# Patient Record
Sex: FEMALE | Race: WHITE | NOT HISPANIC OR LATINO | Employment: FULL TIME | URBAN - METROPOLITAN AREA
[De-identification: names, ages, dates, MRNs, and addresses within clinical notes are randomized per-mention and may not be internally consistent; named-entity substitution may affect disease eponyms.]

---
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
## 2018-03-12 ENCOUNTER — TRANSFERRED RECORDS (OUTPATIENT)
Dept: HEALTH INFORMATION MANAGEMENT | Facility: CLINIC | Age: 18
End: 2018-03-12

## 2018-03-12 LAB
ALT SERPL-CCNC: 124 U/L (ref 13–56)
AST SERPL-CCNC: 160 U/L (ref 15–37)
INR PPP: 1.16

## 2018-03-13 ENCOUNTER — TRANSFERRED RECORDS (OUTPATIENT)
Dept: HEALTH INFORMATION MANAGEMENT | Facility: CLINIC | Age: 18
End: 2018-03-13

## 2018-03-13 LAB
CREAT SERPL-MCNC: 0.65 MG/DL (ref 0.55–1)
GFR SERPL CREATININE-BSD FRML MDRD: 128 ML/MIN/1.73M2
GLUCOSE SERPL-MCNC: 130 MG/DL (ref 70–100)
POTASSIUM SERPL-SCNC: 3.7 MMOL/L (ref 3.5–5.1)

## 2018-03-14 ENCOUNTER — TRANSFERRED RECORDS (OUTPATIENT)
Dept: HEALTH INFORMATION MANAGEMENT | Facility: CLINIC | Age: 18
End: 2018-03-14

## 2018-03-16 ENCOUNTER — TRANSFERRED RECORDS (OUTPATIENT)
Dept: HEALTH INFORMATION MANAGEMENT | Facility: CLINIC | Age: 18
End: 2018-03-16

## 2018-03-19 ENCOUNTER — TELEPHONE (OUTPATIENT)
Dept: FAMILY MEDICINE | Facility: CLINIC | Age: 18
End: 2018-03-19

## 2018-03-19 NOTE — TELEPHONE ENCOUNTER
"Mother called regarding appointment tomorrow and getting records in time for appointment. Pt was hospitalized at:    [place], [place]  [phone]    Called [facility] in medical records  [phone]  Who said no release on file.    Mother notified to sign/fax release today otherwise pt can sign when in clinic tomorrow and we can fax the request to  [phone] with \"STAT release. Patient in clinic\"    VERONIQUE Guerra, RN  [facility]     "

## 2018-03-20 ENCOUNTER — OFFICE VISIT (OUTPATIENT)
Dept: FAMILY MEDICINE | Facility: CLINIC | Age: 18
End: 2018-03-20
Payer: COMMERCIAL

## 2018-03-20 VITALS
SYSTOLIC BLOOD PRESSURE: 101 MMHG | DIASTOLIC BLOOD PRESSURE: 69 MMHG | WEIGHT: 139 LBS | RESPIRATION RATE: 16 BRPM | TEMPERATURE: 97.8 F | HEART RATE: 99 BPM | BODY MASS INDEX: 21.07 KG/M2 | HEIGHT: 68 IN

## 2018-03-20 DIAGNOSIS — S27.0XXA TRAUMATIC PNEUMOTHORAX, INITIAL ENCOUNTER: ICD-10-CM

## 2018-03-20 DIAGNOSIS — S36.113A LACERATION OF LIVER, INITIAL ENCOUNTER: ICD-10-CM

## 2018-03-20 DIAGNOSIS — S22.49XA CLOSED FRACTURE OF MULTIPLE RIBS, UNSPECIFIED LATERALITY, INITIAL ENCOUNTER: Primary | ICD-10-CM

## 2018-03-20 DIAGNOSIS — R79.89 ELEVATED LFTS: ICD-10-CM

## 2018-03-20 LAB
ALBUMIN UR-MCNC: NEGATIVE MG/DL
APPEARANCE UR: CLEAR
BILIRUB UR QL STRIP: NEGATIVE
COLOR UR AUTO: YELLOW
ERYTHROCYTE [DISTWIDTH] IN BLOOD BY AUTOMATED COUNT: 12 % (ref 10–15)
GLUCOSE UR STRIP-MCNC: NEGATIVE MG/DL
HCT VFR BLD AUTO: 41.6 % (ref 35–47)
HGB BLD-MCNC: 14.7 G/DL (ref 11.7–15.7)
HGB UR QL STRIP: NEGATIVE
INR PPP: 0.91 (ref 0.86–1.14)
KETONES UR STRIP-MCNC: NEGATIVE MG/DL
LEUKOCYTE ESTERASE UR QL STRIP: NEGATIVE
MCH RBC QN AUTO: 31.3 PG (ref 26.5–33)
MCHC RBC AUTO-ENTMCNC: 35.3 G/DL (ref 31.5–36.5)
MCV RBC AUTO: 89 FL (ref 78–100)
NITRATE UR QL: NEGATIVE
PH UR STRIP: 6 PH (ref 5–7)
PLATELET # BLD AUTO: 237 10E9/L (ref 150–450)
RBC # BLD AUTO: 4.7 10E12/L (ref 3.8–5.2)
SOURCE: NORMAL
SP GR UR STRIP: 1.01 (ref 1–1.03)
UROBILINOGEN UR STRIP-ACNC: 0.2 EU/DL (ref 0.2–1)
WBC # BLD AUTO: 5.4 10E9/L (ref 4–11)

## 2018-03-20 PROCEDURE — 80053 COMPREHEN METABOLIC PANEL: CPT | Performed by: NURSE PRACTITIONER

## 2018-03-20 PROCEDURE — 36415 COLL VENOUS BLD VENIPUNCTURE: CPT | Performed by: NURSE PRACTITIONER

## 2018-03-20 PROCEDURE — 81003 URINALYSIS AUTO W/O SCOPE: CPT | Performed by: NURSE PRACTITIONER

## 2018-03-20 PROCEDURE — 85610 PROTHROMBIN TIME: CPT | Performed by: NURSE PRACTITIONER

## 2018-03-20 PROCEDURE — 85027 COMPLETE CBC AUTOMATED: CPT | Performed by: NURSE PRACTITIONER

## 2018-03-20 PROCEDURE — 99214 OFFICE O/P EST MOD 30 MIN: CPT | Performed by: NURSE PRACTITIONER

## 2018-03-20 NOTE — MR AVS SNAPSHOT
"              After Visit Summary   3/20/2018    Trice Fletcher    MRN: [id number]           Patient Information     Date Of Birth          2000        Visit Information        Provider Department      3/20/2018 2:15 PM Tracy Barlow NP [facility]        Today's Diagnoses     Closed fracture of multiple ribs, unspecified laterality, initial encounter    -  1    Traumatic pneumothorax, initial encounter        Laceration of liver, initial encounter           Follow-ups after your visit        Your next 10 appointments already scheduled     Mar 27, 2018  3:30 PM CDT   PHYSICAL with Tracy Barlow NP   [facility] ([facility]    [address] [facility] [address] [phone]              Who to contact     If you have questions or need follow up information about today's clinic visit or your schedule please contact [facility] directly at [phone].  Normal or non-critical lab and imaging results will be communicated to you by MyChart, letter or phone within 4 business days after the clinic has received the results. If you do not hear from us within 7 days, please contact the clinic through [employer] or phone. If you have a critical or abnormal lab result, we will notify you by phone as soon as possible.  Submit refill requests through [employer] or call your pharmacy and they will forward the refill request to us. Please allow 3 business days for your refill to be completed.          Additional Information About Your Visit        [employer] Information     [employer] lets you send messages to your doctor, view your test results, renew your prescriptions, schedule appointments and more. To sign up, go to [email or web address] . Click on \"Log in\" on the left side of the screen, which will take you to the Welcome page. Then click on \"Sign up Now\" on the right side of the page.     You will be asked to enter the access code " "listed below, as well as some personal information. Please follow the directions to create your username and password.     Your access code is: [email or web address]  Expires: 2018  2:15 PM     Your access code will  in 90 days. If you need help or a new code, please call your [facility] clinic or [phone].        Care EveryWhere ID     This is your Care EveryWhere ID. This could be used by other organizations to access your [facility] medical records  [id number]        Your Vitals Were     Pulse Temperature Respirations Height BMI (Body Mass Index)       99 97.8  F (36.6  C) (Oral) 16 5' 8\" (1.727 m) 21.13 kg/m2        Blood Pressure from Last 3 Encounters:   18 101/69   16 98/56   10/07/16 108/64    Weight from Last 3 Encounters:   18 139 lb (63 kg) (69 %)*   16 152 lb (68.9 kg) (86 %)*   10/07/16 151 lb (68.5 kg) (85 %)*     * Growth percentiles are based on CDC 2-20 Years data.              We Performed the Following     *UA reflex to Microscopic     CBC with platelets     Comprehensive metabolic panel (BMP + Alb, Alk Phos, ALT, AST, Total. Bili, TP)     INR        Primary Care Provider Office Phone # Fax #    Tracy SIENNA Barlow [phone] [address] [facility] [address]        Equal Access to Services     SKYE GRAY : Hadii aad ku hadasho Soomaali, waaxda luqadaha, qaybta kaalmada genetegyada, kaushal irby . So [facility] [phone].    ATENCIÓN: Si habla español, tiene a verma disposición servicios gratuitos de asistencia lingüística. Llame al [phone].    We comply with applicable federal civil rights laws and [place] laws. We do not discriminate on the basis of race, color, national origin, age, disability, sex, sexual orientation, or gender identity.            Thank you!     Thank you for choosing [facility]  for your care. Our goal is always to provide you with excellent care. Hearing back from our " patients is one way we can continue to improve our services. Please take a few minutes to complete the written survey that you may receive in the mail after your visit with us. Thank you!             Your Updated Medication List - Protect others around you: Learn how to safely use, store and throw away your medicines at www.disposemymeds.org.          This list is accurate as of 3/20/18  6:40 PM.  Always use your most recent med list.                   Brand Name Dispense Instructions for use Diagnosis    5-HTP PO      Take 50 mg by mouth daily        desogestrel-ethinyl estradiol 0.15-30 MG-MCG per tablet    APRI    84 tablet    Take 1 tablet by mouth daily    Acne, unspecified acne type       FISH OIL      1 tab daily        VESTURA 3-0.02 MG per tablet   Generic drug:  drospirenone-ethinyl estradiol      Take 1 tablet by mouth daily        VITAMIN D (CHOLECALCIFEROL) PO      Take 5,000 Units by mouth daily
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
## 2018-03-21 ENCOUNTER — DOCUMENTATION ONLY (OUTPATIENT)
Dept: FAMILY MEDICINE | Facility: CLINIC | Age: 18
End: 2018-03-21

## 2018-03-21 LAB
ALBUMIN SERPL-MCNC: 3.4 G/DL (ref 3.4–5)
ALP SERPL-CCNC: 103 U/L (ref 40–150)
ALT SERPL W P-5'-P-CCNC: 78 U/L (ref 0–50)
ANION GAP SERPL CALCULATED.3IONS-SCNC: 5 MMOL/L (ref 3–14)
AST SERPL W P-5'-P-CCNC: 46 U/L (ref 0–35)
BILIRUB SERPL-MCNC: 0.5 MG/DL (ref 0.2–1.3)
BUN SERPL-MCNC: 10 MG/DL (ref 7–30)
CALCIUM SERPL-MCNC: 8.7 MG/DL (ref 8.5–10.1)
CHLORIDE SERPL-SCNC: 107 MMOL/L (ref 96–110)
CO2 SERPL-SCNC: 29 MMOL/L (ref 20–32)
CREAT SERPL-MCNC: 0.73 MG/DL (ref 0.5–1)
GFR SERPL CREATININE-BSD FRML MDRD: >90 ML/MIN/1.7M2
GLUCOSE SERPL-MCNC: 100 MG/DL (ref 70–99)
POTASSIUM SERPL-SCNC: 4.3 MMOL/L (ref 3.4–5.3)
PROT SERPL-MCNC: 7 G/DL (ref 6.8–8.8)
SODIUM SERPL-SCNC: 141 MMOL/L (ref 133–144)

## 2018-03-26 ENCOUNTER — TRANSFERRED RECORDS (OUTPATIENT)
Dept: HEALTH INFORMATION MANAGEMENT | Facility: CLINIC | Age: 18
End: 2018-03-26

## 2018-06-07 ENCOUNTER — OFFICE VISIT (OUTPATIENT)
Dept: FAMILY MEDICINE | Facility: CLINIC | Age: 18
End: 2018-06-07
Payer: COMMERCIAL

## 2018-06-07 VITALS
OXYGEN SATURATION: 98 % | TEMPERATURE: 98 F | WEIGHT: 143 LBS | DIASTOLIC BLOOD PRESSURE: 68 MMHG | HEART RATE: 73 BPM | BODY MASS INDEX: 21.74 KG/M2 | SYSTOLIC BLOOD PRESSURE: 104 MMHG | RESPIRATION RATE: 18 BRPM

## 2018-06-07 DIAGNOSIS — R79.89 ELEVATED LFTS: Primary | ICD-10-CM

## 2018-06-07 DIAGNOSIS — Z11.3 SCREEN FOR STD (SEXUALLY TRANSMITTED DISEASE): ICD-10-CM

## 2018-06-07 DIAGNOSIS — S22.49XD CLOSED FRACTURE OF MULTIPLE RIBS WITH ROUTINE HEALING, UNSPECIFIED LATERALITY, SUBSEQUENT ENCOUNTER: ICD-10-CM

## 2018-06-07 PROCEDURE — 99213 OFFICE O/P EST LOW 20 MIN: CPT | Performed by: NURSE PRACTITIONER

## 2018-06-07 PROCEDURE — 84450 TRANSFERASE (AST) (SGOT): CPT | Performed by: NURSE PRACTITIONER

## 2018-06-07 PROCEDURE — 84460 ALANINE AMINO (ALT) (SGPT): CPT | Performed by: NURSE PRACTITIONER

## 2018-06-07 PROCEDURE — 87491 CHLMYD TRACH DNA AMP PROBE: CPT | Performed by: NURSE PRACTITIONER

## 2018-06-07 PROCEDURE — 36415 COLL VENOUS BLD VENIPUNCTURE: CPT | Performed by: NURSE PRACTITIONER
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
## 2018-06-07 NOTE — MR AVS SNAPSHOT
After Visit Summary   6/7/2018    Trice Fletcher    MRN: [id number]           Patient Information     Date Of Birth          2000        Visit Information        Provider Department      6/7/2018 1:00 PM Tracy Barlow NP [facility]        Today's Diagnoses     Elevated LFTs    -  1    Closed fracture of multiple ribs with routine healing, unspecified laterality, subsequent encounter        Screen for STD (sexually transmitted disease)           Follow-ups after your visit        Who to contact     If you have questions or need follow up information about today's clinic visit or your schedule please contact [facility] directly at [phone].  Normal or non-critical lab and imaging results will be communicated to you by MyChart, letter or phone within 4 business days after the clinic has received the results. If you do not hear from us within 7 days, please contact the clinic through MyChart or phone. If you have a critical or abnormal lab result, we will notify you by phone as soon as possible.  Submit refill requests through [employer] or call your pharmacy and they will forward the refill request to us. Please allow 3 business days for your refill to be completed.          Additional Information About Your Visit        MyChart Information     Linkage gives you secure access to your electronic health record. If you see a primary care provider, you can also send messages to your care team and make appointments. If you have questions, please call your primary care clinic.  If you do not have a primary care provider, please call [phone] and they will assist you.        Care EveryWhere ID     This is your Care EveryWhere ID. This could be used by other organizations to access your [facility] medical records  [id number]        Your Vitals Were     Pulse Temperature Respirations Pulse Oximetry BMI (Body Mass Index)       73 98  F (36.7  C) (Oral) 18 98%  21.74 kg/m2        Blood Pressure from Last 3 Encounters:   06/07/18 104/68   03/20/18 101/69   11/30/16 98/56    Weight from Last 3 Encounters:   06/07/18 143 lb (64.9 kg) (74 %)*   03/20/18 139 lb (63 kg) (69 %)*   11/30/16 152 lb (68.9 kg) (86 %)*     * Growth percentiles are based on [facility] 2-20 Years data.              We Performed the Following     **ALT FUTURE anytime     AST     Chlamydia trachomatis PCR        Primary Care Provider Office Phone # Fax #    Tracy EL CHELSEA Barlow [phone] [address] [facility] [address]        Equal Access to Services     ZACKERY GRAY : Hadii norman velao Somarycruz, waaxda luqadaha, qaybta kaalmada adeegyada, waxay theo irby . So [facility] [phone].    ATENCIÓN: Si habla español, tiene a verma disposición servicios gratuitos de asistencia lingüística. [facility] [phone].    We comply with applicable federal civil rights laws and [place] laws. We do not discriminate on the basis of race, color, national origin, age, disability, sex, sexual orientation, or gender identity.            Thank you!     Thank you for choosing [facility]  for your care. Our goal is always to provide you with excellent care. Hearing back from our patients is one way we can continue to improve our services. Please take a few minutes to complete the written survey that you may receive in the mail after your visit with us. Thank you!             Your Updated Medication List - Protect others around you: Learn how to safely use, store and throw away your medicines at www.disposemymeds.org.          This list is accurate as of 6/7/18  1:42 PM.  Always use your most recent med list.                   Brand Name Dispense Instructions for use Diagnosis    desogestrel-ethinyl estradiol 0.15-30 MG-MCG per tablet    APRI    84 tablet    Take 1 tablet by mouth daily    Acne, unspecified acne type       FISH OIL      1 tab daily        VITAMIN  D (CHOLECALCIFEROL) PO      Take 5,000 Units by mouth daily

## 2018-06-08 LAB
ALT SERPL W P-5'-P-CCNC: 23 U/L (ref 0–50)
AST SERPL W P-5'-P-CCNC: 16 U/L (ref 0–35)
C TRACH DNA SPEC QL NAA+PROBE: NEGATIVE
SPECIMEN SOURCE: NORMAL

## 2018-08-17 ENCOUNTER — OFFICE VISIT (OUTPATIENT)
Dept: FAMILY MEDICINE | Facility: CLINIC | Age: 18
End: 2018-08-17
Payer: COMMERCIAL

## 2018-08-17 VITALS
SYSTOLIC BLOOD PRESSURE: 106 MMHG | DIASTOLIC BLOOD PRESSURE: 62 MMHG | HEIGHT: 68 IN | WEIGHT: 141 LBS | BODY MASS INDEX: 21.37 KG/M2 | HEART RATE: 80 BPM

## 2018-08-17 DIAGNOSIS — Z83.49 FAMILY HISTORY OF THYROID DISEASE: ICD-10-CM

## 2018-08-17 DIAGNOSIS — Z00.00 WELL FEMALE EXAM WITHOUT GYNECOLOGICAL EXAM: Primary | ICD-10-CM

## 2018-08-17 DIAGNOSIS — E55.9 VITAMIN D DEFICIENCY: ICD-10-CM

## 2018-08-17 DIAGNOSIS — L70.9 ACNE, UNSPECIFIED ACNE TYPE: ICD-10-CM

## 2018-08-17 PROCEDURE — 84443 ASSAY THYROID STIM HORMONE: CPT | Performed by: NURSE PRACTITIONER

## 2018-08-17 PROCEDURE — 99385 PREV VISIT NEW AGE 18-39: CPT | Performed by: NURSE PRACTITIONER

## 2018-08-17 PROCEDURE — 36415 COLL VENOUS BLD VENIPUNCTURE: CPT | Performed by: NURSE PRACTITIONER

## 2018-08-17 PROCEDURE — 82306 VITAMIN D 25 HYDROXY: CPT | Performed by: NURSE PRACTITIONER

## 2018-08-17 RX ORDER — DESOGESTREL AND ETHINYL ESTRADIOL 0.15-0.03
1 KIT ORAL DAILY
Qty: 84 TABLET | Refills: 3 | Status: SHIPPED | OUTPATIENT
Start: 2018-08-17 | End: 2019-12-31
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
## 2018-08-17 NOTE — MR AVS SNAPSHOT
After Visit Summary   8/17/2018    Trice Fletcher    MRN: [id number]           Patient Information     Date Of Birth          2000        Visit Information        Provider Department      8/17/2018 3:00 PM Marguerite Knott APRN [facility]        Today's Diagnoses     Well female exam without gynecological exam    -  1    Acne, unspecified acne type        Vitamin D deficiency        Family history of thyroid disease          Care Instructions      Preventive Health Recommendations  Female Ages 18 to 20     Yearly exam:     See your health care provider every year in order to  o Review health changes.   o Discuss preventive care.    o Review your medicines if your doctor has prescribed any.      You should be tested each year for STDs (sexually transmitted diseases).       After age 20, talk to your provider about how often you should have cholesterol testing.      If you are at risk for diabetes, you should have a diabetes test (fasting glucose).     Shots:     Get a flu shot each year.     Get a tetanus shot every 10 years.     Consider getting the shot (vaccine) that prevents cervical cancer (Gardasil).    Nutrition:     Eat at least 5 servings of fruits and vegetables each day.    Eat whole-grain bread, whole-wheat pasta and brown rice instead of white grains and rice.    Get adequate Calcium and Vitamin D.     Lifestyle    Exercise at least 150 minutes a week each week (30 minutes a day, 5 days a week). This will help you control your weight and prevent disease.    No smoking.     Wear sunscreen to prevent skin cancer.    See your dentist every six months for an exam and cleaning.    Preventive Health Recommendations  Female Ages 18 to 20     Yearly exam:     See your health care provider every year in order to  o Review health changes.   o Discuss preventive care.    o Review your medicines if your doctor has prescribed any.      You should be tested each year for  STDs (sexually transmitted diseases).       After age 20, talk to your provider about how often you should have cholesterol testing.      If you are at risk for diabetes, you should have a diabetes test (fasting glucose).     Shots:     Get a flu shot each year.     Get a tetanus shot every 10 years.     Consider getting the shot (vaccine) that prevents cervical cancer (Gardasil).    Nutrition:     Eat at least 5 servings of fruits and vegetables each day.    Eat whole-grain bread, whole-wheat pasta and brown rice instead of white grains and rice.    Get adequate Calcium and Vitamin D.     Lifestyle    Exercise at least 150 minutes a week each week (30 minutes a day, 5 days a week). This will help you control your weight and prevent disease.    No smoking.     Wear sunscreen to prevent skin cancer.    See your dentist every six months for an exam and cleaning.          Follow-ups after your visit        Who to contact     If you have questions or need follow up information about today's clinic visit or your schedule please contact [facility] directly at [phone].  Normal or non-critical lab and imaging results will be communicated to you by [employer], letter or phone within 4 business days after the clinic has received the results. If you do not hear from us within 7 days, please contact the clinic through [employer] or phone. If you have a critical or abnormal lab result, we will notify you by phone as soon as possible.  Submit refill requests through [employer] or call your pharmacy and they will forward the refill request to us. Please allow 3 business days for your refill to be completed.          Additional Information About Your Visit        [employer] Information     [employer] gives you secure access to your electronic health record. If you see a primary care provider, you can also send messages to your care team and make appointments. If you have questions, please call your primary care clinic.   "If you do not have a primary care provider, please call [phone] and they will assist you.        Care EveryWhere ID     This is your Care EveryWhere ID. This could be used by other organizations to access your [facility] medical records  [id number]        Your Vitals Were     Pulse Height Last Period Breastfeeding? BMI (Body Mass Index)       80 5' 8\" (1.727 m) 07/27/2018 No 21.44 kg/m2        Blood Pressure from Last 3 Encounters:   08/17/18 106/62   06/07/18 104/68   03/20/18 101/69    Weight from Last 3 Encounters:   08/17/18 141 lb (64 kg) (71 %)*   06/07/18 143 lb (64.9 kg) (74 %)*   03/20/18 139 lb (63 kg) (69 %)*     * Growth percentiles are based on [facility] 2-20 Years data.              We Performed the Following     TSH with free T4 reflex     Vitamin D Deficiency          Where to get your medicines      These medications were sent to [id number] - [place], [place] - [address] AT [facility] [address]  [facility] [address], [place] [address]    Hours:  24-hours Phone:  [phone]     desogestrel-ethinyl estradiol 0.15-30 MG-MCG per tablet          Primary Care Provider Office Phone # Fax #    Tracy SIENNA Barlow [phone]       [address] [facility] [address]        Equal Access to Services     ZACKERY GRAY AH: Hadii aad ku hadasho Soomaali, waaxda luqadaha, qaybta kaalmada genetegyada, waxcarlitos theo garza. So [place] [phone].    ATENCIÓN: Si elijahla nessa, tiene a verma disposición servicios gratuitos de asistencia lingüística. Llame al [phone].    We comply with applicable federal civil rights laws and [place] laws. We do not discriminate on the basis of race, color, national origin, age, disability, sex, sexual orientation, or gender identity.            Thank you!     Thank you for choosing [facility]  for your care. Our goal is always to provide you with excellent care. Hearing back from our patients " is one way we can continue to improve our services. Please take a few minutes to complete the written survey that you may receive in the mail after your visit with us. Thank you!             Your Updated Medication List - Protect others around you: Learn how to safely use, store and throw away your medicines at www.disposemymeds.org.          This list is accurate as of 8/17/18 11:59 PM.  Always use your most recent med list.                   Brand Name Dispense Instructions for use Diagnosis    desogestrel-ethinyl estradiol 0.15-30 MG-MCG per tablet    APRI    84 tablet    Take 1 tablet by mouth daily    Acne, unspecified acne type       FISH OIL      1 tab daily        UNKNOWN TO PATIENT           VITAMIN D (CHOLECALCIFEROL) PO      Take 5,000 Units by mouth daily

## 2018-08-18 LAB — TSH SERPL DL<=0.005 MIU/L-ACNC: 1.39 MU/L (ref 0.4–4)

## 2018-08-20 LAB — DEPRECATED CALCIDIOL+CALCIFEROL SERPL-MC: 50 UG/L (ref 20–75)

## 2018-08-31 ENCOUNTER — TELEPHONE (OUTPATIENT)
Dept: FAMILY MEDICINE | Facility: CLINIC | Age: 18
End: 2018-08-31

## 2018-08-31 DIAGNOSIS — L70.0 ACNE VULGARIS: Primary | ICD-10-CM

## 2018-08-31 RX ORDER — DROSPIRENONE AND ETHINYL ESTRADIOL 0.02-3(28)
1 KIT ORAL DAILY
Qty: 84 TABLET | Refills: 3 | Status: SHIPPED | OUTPATIENT
Start: 2018-08-31 | End: 2019-12-31

## 2018-08-31 NOTE — TELEPHONE ENCOUNTER
Reason for Call:  Medication or medication refill:    Do you use a [facility]?  Name of the pharmacy and phone number for the current request:  [facility] on [address] - [phone]    Name of the medication requested: Loryna 3 mg/ 0.02 mg    Other request: patient said that at her appointment she gave there wrong name of birthcontrol medication that she has been using so she is wanting the correct birthcontrol medication filled for her    Can we leave a detailed message on this number? YES    Phone number patient can be reached at: Home number on file [phone] (home)    Best Time: asap    Call taken on 8/31/2018 at 8:38 AM by Mary Kay Aden

## 2019-12-16 ENCOUNTER — HEALTH MAINTENANCE LETTER (OUTPATIENT)
Age: 19
End: 2019-12-16

## 2019-12-31 ENCOUNTER — OFFICE VISIT (OUTPATIENT)
Dept: FAMILY MEDICINE | Facility: CLINIC | Age: 19
End: 2019-12-31
Payer: COMMERCIAL

## 2019-12-31 VITALS
OXYGEN SATURATION: 97 % | BODY MASS INDEX: 20.98 KG/M2 | HEART RATE: 74 BPM | WEIGHT: 138 LBS | SYSTOLIC BLOOD PRESSURE: 102 MMHG | TEMPERATURE: 98.5 F | RESPIRATION RATE: 18 BRPM | DIASTOLIC BLOOD PRESSURE: 64 MMHG

## 2019-12-31 DIAGNOSIS — J01.90 ACUTE SINUSITIS WITH SYMPTOMS > 10 DAYS: Primary | ICD-10-CM

## 2019-12-31 PROCEDURE — 99213 OFFICE O/P EST LOW 20 MIN: CPT | Performed by: FAMILY MEDICINE

## 2019-12-31 RX ORDER — FLUTICASONE PROPIONATE 50 MCG
1-2 SPRAY, SUSPENSION (ML) NASAL DAILY
Qty: 16 G | Refills: 11 | Status: SHIPPED | OUTPATIENT
Start: 2019-12-31 | End: 2023-06-21

## 2019-12-31 RX ORDER — AMOXICILLIN 500 MG/1
1000 CAPSULE ORAL 2 TIMES DAILY
Qty: 40 CAPSULE | Refills: 0 | Status: SHIPPED | OUTPATIENT
Start: 2019-12-31 | End: 2020-08-05

## 2019-12-31 NOTE — PROGRESS NOTES
Subjective     Trice Fletcher is a 19 year old female who presents to clinic today for the following health issues:    HPI   Acute Illness   Acute illness concerns: congestion  Onset: x a few weeks    Fever: YES    Chills/Sweats: no     Headache (location?): YES- Temples and frontal lobe    Sinus Pressure:no    Conjunctivitis:  no    Ear Pain: no    Rhinorrhea: YES    Congestion: YES    Sore Throat: no      Cough: YES-productive of yellow sputum    Wheeze: YES    Decreased Appetite: YES    Nausea: no     Vomiting: no     Diarrhea:  no     Dysuria/Freq.: no     Fatigue/Achiness: YES    Sick/Strep Exposure: YES     Therapies Tried and outcome:  Tried cold formulations which help the symptoms some.     except of these symptoms occurring frequently this fall for unclear reasons. Usually they last fa couple of weeks then resolve then she gets them again a few weeks later.     She is living in a new place and has a dog. No other new exposures. 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 No smoking. Otherwise feels healthy in general.     Reviewed and updated as needed this visit by Provider  Allergies  Meds  Problems  Med Hx  Surg Hx         Review of Systems   No other const, cv, heme symptoms       Objective    /64   Pulse 74   Temp 98.5  F (36.9  C) (Tympanic)   Resp 18   Wt 62.6 kg (138 lb)   SpO2 97%   BMI 20.98 kg/m    Body mass index is 20.98 kg/m .  Physical Exam   GENERAL: healthy, alert and no distress  HENT: normal cephalic/atraumatic, ear canals and TM's normal, nasal mucosa edematous , oropharynx clear and oral mucous membranes moist  NECK: no adenopathy, no asymmetry, masses, or scars and thyroid normal to palpation  RESP: lungs clear to auscultation - no rales, rhonchi or wheezes  CV: regular rate and rhythm, normal S1 S2, no S3 or S4, no murmur, click or rub, no peripheral edema and peripheral pulses strong        Assessment & Plan     1. Acute sinusitis with symptoms > 10  days  Decided to treat for allergies/ sinus infection at present. Set up for physical in a few weeks. She may have underlying allergies leading to more frequent sinus issues. Doubt that she acquired new immune compromise. May consider testing at her upcoming physcial if symptoms are persisting.   - amoxicillin (AMOXIL) 500 MG capsule; Take 2 capsules (1,000 mg) by mouth 2 times daily  Dispense: 40 capsule; Refill: 0  - fluticasone (FLONASE) 50 MCG/ACT nasal spray; Spray 1-2 sprays into both nostrils daily  Dispense: 16 g; Refill: 11       Return in about 3 weeks (around 1/21/2020) for Physical Exam.    Ismael Pizarro MD  [facility]

## 2020-03-22 ENCOUNTER — HEALTH MAINTENANCE LETTER (OUTPATIENT)
Age: 20
End: 2020-03-22

## 2020-08-05 ENCOUNTER — TELEPHONE (OUTPATIENT)
Dept: FAMILY MEDICINE | Facility: CLINIC | Age: 20
End: 2020-08-05

## 2020-08-05 ENCOUNTER — HOSPITAL ENCOUNTER (EMERGENCY)
Facility: CLINIC | Age: 20
Discharge: HOME | End: 2020-08-05
Attending: EMERGENCY MEDICINE | Admitting: EMERGENCY MEDICINE
Payer: COMMERCIAL

## 2020-08-05 ENCOUNTER — OFFICE VISIT (OUTPATIENT)
Dept: URGENT CARE | Facility: URGENT CARE | Age: 20
End: 2020-08-05
Payer: COMMERCIAL

## 2020-08-05 VITALS
TEMPERATURE: 98 F | WEIGHT: 140 LBS | DIASTOLIC BLOOD PRESSURE: 64 MMHG | HEIGHT: 68 IN | HEART RATE: 77 BPM | RESPIRATION RATE: 16 BRPM | SYSTOLIC BLOOD PRESSURE: 104 MMHG | BODY MASS INDEX: 21.22 KG/M2 | OXYGEN SATURATION: 98 %

## 2020-08-05 DIAGNOSIS — L50.9 URTICARIA: Primary | ICD-10-CM

## 2020-08-05 DIAGNOSIS — A08.4 VIRAL GASTROENTERITIS: ICD-10-CM

## 2020-08-05 DIAGNOSIS — T78.40XA ALLERGIC REACTION, INITIAL ENCOUNTER: ICD-10-CM

## 2020-08-05 PROCEDURE — 99214 OFFICE O/P EST MOD 30 MIN: CPT | Performed by: INTERNAL MEDICINE

## 2020-08-05 PROCEDURE — 99284 EMERGENCY DEPT VISIT MOD MDM: CPT | Mod: 25 | Performed by: EMERGENCY MEDICINE

## 2020-08-05 PROCEDURE — 96375 TX/PRO/DX INJ NEW DRUG ADDON: CPT | Performed by: EMERGENCY MEDICINE

## 2020-08-05 PROCEDURE — 96374 THER/PROPH/DIAG INJ IV PUSH: CPT | Performed by: EMERGENCY MEDICINE

## 2020-08-05 PROCEDURE — 96372 THER/PROPH/DIAG INJ SC/IM: CPT | Performed by: EMERGENCY MEDICINE

## 2020-08-05 PROCEDURE — 25000125 ZZHC RX 250: Performed by: EMERGENCY MEDICINE

## 2020-08-05 PROCEDURE — 25000128 H RX IP 250 OP 636: Performed by: EMERGENCY MEDICINE

## 2020-08-05 PROCEDURE — 99284 EMERGENCY DEPT VISIT MOD MDM: CPT | Mod: Z6 | Performed by: EMERGENCY MEDICINE

## 2020-08-05 RX ORDER — DIPHENHYDRAMINE HYDROCHLORIDE 50 MG/ML
25 INJECTION INTRAMUSCULAR; INTRAVENOUS ONCE
Status: COMPLETED | OUTPATIENT
Start: 2020-08-05 | End: 2020-08-05

## 2020-08-05 RX ORDER — METHYLPREDNISOLONE SODIUM SUCCINATE 125 MG/2ML
125 INJECTION, POWDER, LYOPHILIZED, FOR SOLUTION INTRAMUSCULAR; INTRAVENOUS ONCE
Status: COMPLETED | OUTPATIENT
Start: 2020-08-05 | End: 2020-08-05

## 2020-08-05 RX ORDER — LIDOCAINE 40 MG/G
CREAM TOPICAL
Status: DISCONTINUED | OUTPATIENT
Start: 2020-08-05 | End: 2020-08-06 | Stop reason: HOSPADM

## 2020-08-05 RX ORDER — EPINEPHRINE 0.3 MG/.3ML
0.3 INJECTION SUBCUTANEOUS
Qty: 1 EACH | Refills: 0 | Status: SHIPPED | OUTPATIENT
Start: 2020-08-05 | End: 2024-02-15

## 2020-08-05 RX ORDER — PREDNISONE 20 MG/1
40 TABLET ORAL DAILY
Qty: 10 TABLET | Refills: 0 | Status: SHIPPED | OUTPATIENT
Start: 2020-08-05 | End: 2020-08-10

## 2020-08-05 RX ORDER — SPIRONOLACTONE 50 MG/1
50 TABLET, FILM COATED ORAL DAILY
COMMUNITY

## 2020-08-05 RX ORDER — SODIUM CHLORIDE 9 MG/ML
INJECTION, SOLUTION INTRAVENOUS
Status: DISCONTINUED
Start: 2020-08-05 | End: 2020-08-06 | Stop reason: HOSPADM

## 2020-08-05 RX ADMIN — METHYLPREDNISOLONE SODIUM SUCCINATE 125 MG: 125 INJECTION, POWDER, FOR SOLUTION INTRAMUSCULAR; INTRAVENOUS at 21:05

## 2020-08-05 RX ADMIN — DIPHENHYDRAMINE HYDROCHLORIDE 25 MG: 50 INJECTION, SOLUTION INTRAMUSCULAR; INTRAVENOUS at 21:04

## 2020-08-05 RX ADMIN — EPINEPHRINE 0.5 MG: 1 INJECTION INTRAMUSCULAR; INTRAVENOUS; SUBCUTANEOUS at 20:59

## 2020-08-05 ASSESSMENT — ENCOUNTER SYMPTOMS
HEADACHES: 0
HEADACHES: 0
ARTHRALGIAS: 0
COLOR CHANGE: 0
ABDOMINAL PAIN: 0
WHEEZING: 0
DIFFICULTY URINATING: 0
SORE THROAT: 0
COUGH: 1
FEVER: 1
NECK STIFFNESS: 0
DIZZINESS: 0
FEVER: 0
MYALGIAS: 0
SHORTNESS OF BREATH: 0
TROUBLE SWALLOWING: 0
FACIAL SWELLING: 1
EYE REDNESS: 0
CONFUSION: 0

## 2020-08-05 ASSESSMENT — MIFFLIN-ST. JEOR: SCORE: 1448.54

## 2020-08-05 NOTE — ED AVS SNAPSHOT
[facility], [place], Emergency Department  [address]  [facility] [address]  Phone:  [phone]  Fax:  [phone]                                    Trice Fletcher   MRN: [id number]    Department:  [facility], Emergency Department   Date of Visit:  8/5/2020           After Visit Summary Signature Page    I have received my discharge instructions, and my questions have been answered. I have discussed any challenges I see with this plan with the nurse or doctor.    ..........................................................................................................................................  Patient/Patient Representative Signature      ..........................................................................................................................................  Patient Representative Print Name and Relationship to Patient    ..................................................               ................................................  Date                                   Time    ..........................................................................................................................................  Reviewed by Signature/Title    ...................................................              ..............................................  Date                                               Time          22EPIC Rev 08/18

## 2020-08-05 NOTE — PROGRESS NOTES
SUBJECTIVE:   Trice Fletcher is a 19 year old female presenting with a chief complaint of   Chief Complaint   Patient presents with     Urgent Care     Derm Problem     all over very red and very itchy rash. unknown cause but did go camping yesterday in [facility]       She is an established patient of [facility].    Rash  Started while camping 2 days ago with stomach upset & loose stools  Was itching  Following morning had rash    Course of illness is worsening.    Current and Associated symptoms: itching and red   Location of the rash: generalized.  Previous history of a similar rash? No  Recent exposure history: none known except crop dusting, camping, hiking on paths  Denies exposure to: medications, new household products and new skincare products  Associated symptoms include: nausea, vomiting and loose stools.  Treatment measures tried include: benadryl, anti itch cream        Review of Systems   Constitutional: Positive for fever (99.5).   HENT: Negative for sore throat and trouble swallowing.    Respiratory: Positive for cough (little). Negative for wheezing.    Musculoskeletal: Negative for myalgias.   Allergic/Immunologic: Negative for environmental allergies and food allergies.   Neurological: Negative for dizziness and headaches.       Past Medical History:   Diagnosis Date     Contact dermatitis and other eczema, due to unspecified cause      Family History   Problem Relation Age of Onset     Hypertension Maternal Grandmother         great     Cerebrovascular Disease Maternal Grandmother         great     Breast Cancer Maternal Grandmother         great     Cerebrovascular Disease Maternal Grandfather         great     Diabetes Paternal Uncle      C.A.D. No family hx of      Cancer - colorectal No family hx of      Current Outpatient Medications   Medication Sig Dispense Refill     predniSONE (DELTASONE) 20 MG tablet Take 2 tablets (40 mg) by mouth daily for 5 days 10 tablet 0     amoxicillin (AMOXIL) 500  "MG capsule Take 2 capsules (1,000 mg) by mouth 2 times daily 40 capsule 0     FISH OIL 1 tab daily       fluticasone (FLONASE) 50 MCG/ACT nasal spray Spray 1-2 sprays into both nostrils daily 16 g 11     UNKNOWN TO PATIENT        VITAMIN D, CHOLECALCIFEROL, PO Take 5,000 Units by mouth daily       Social History     Tobacco Use     Smoking status: Never Smoker     Smokeless tobacco: Never Used   Substance Use Topics     Alcohol use: Yes     Comment: one drink per week        OBJECTIVE  /64   Pulse 77   Temp 98  F (36.7  C) (Oral)   Resp 16   Ht 1.727 m (5' 8\")   Wt 63.5 kg (140 lb)   LMP 07/22/2020   SpO2 98%   Breastfeeding No   BMI 21.29 kg/m      Physical Exam  Vitals signs reviewed.   Constitutional:       Appearance: Normal appearance.   HENT:      Mouth/Throat:      Mouth: Mucous membranes are moist.   Eyes:      Conjunctiva/sclera: Conjunctivae normal.   Cardiovascular:      Rate and Rhythm: Normal rate and regular rhythm.      Pulses: Normal pulses.      Heart sounds: Normal heart sounds.   Pulmonary:      Effort: Pulmonary effort is normal.      Breath sounds: Normal breath sounds. No wheezing.   Abdominal:      Palpations: Abdomen is soft.      Tenderness: There is no abdominal tenderness.   Musculoskeletal:      Right lower leg: No edema.      Left lower leg: No edema.   Skin:     Findings: Rash present.      Comments: Generalized red rash coalescing consistent with hives   Neurological:      Mental Status: She is alert.                 Labs:  No results found for this or any previous visit (from the past 24 hour(s)).        ASSESSMENT:      ICD-10-CM    1. Urticaria  L50.9 predniSONE (DELTASONE) 20 MG tablet   2. Viral gastroenteritis  A08.4         Medical Decision Making:  Unclear etiology of hives.  She does have symptoms after gastroenteritis.  Hives can be caused from a virus.  Recommended diary of potential exposures including food diary.  Her hives are extensive although she does " not have symptoms of wheezing or any other systemic concerns.    She does have allergy appointment tomorrow.  Discussed usually they have patient stop antihistamines 1 week prior to appointment as can affect tests.    Treatment plan prednisone 5-day course  Discussed usually antihistamines need to be stopped for 1 week and since already taken today she may continue to take antihistamines  Reviewed urticaria handout briefly with patient. discussed potential food triggers      Patient Instructions   Prednisone 40 mg 5 days    Zyrtec 10 mg daily  Benadryl 50 mg night.    Food & exposure diary  Viruses can also cause hives    gastroenteritis     Fluids  Easy to digest foods for 1 week.      Patient Education     Understanding Hives (Urticaria)    Urticaria (hives) are red, itchy, and swollen areas on the skin. They are most often an allergic reaction from eating a food or taking a medicine. Sometimes the cause may be unknown. A single hive can vary in size from a half inch to several inches. Hives can appear all over the body. Or they may appear on only one part of the body.  What causes hives?  Hives can be caused by food and beverages such as:    Tree nuts (almonds, walnuts, hazelnuts)    Peanuts    Eggs    Shellfish    Milk  Hives can also be caused by medicines such as:    Antibiotics, especially penicillin and sulfa-based medicines     Anticonvulsant or antiseizure medicines     Chemotherapy medicines   Other causes of hives include:    Infection or virus    Heat    Cold air or cold water    Exercise    Scratching or rubbing your skin, or wearing tight-fitting clothes that rub your skin    Being exposed to sunlight or light from a light bulb, in rare cases    Inhaled-chemicals in the environment from foods and drugs, insects, plants, or other sources  In some cases, hives may occur again and again with no specific cause.  If you have hives    Stay away from the food, drink, medicine, or other thing that may be  causing the hives.    Ask your healthcare provider how to control itchy or irritated skin.    Talk with your healthcare provider right away if you think a medicine gave you hives.  Watch for anaphylaxis  If you have hives, watch for symptoms of a severe reaction that can affect your entire body. This is called anaphylaxis. Symptoms can include swollen areas of the body, wheezing, trouble breathing or swallowing, and a hoarse voice. This reaction may happen right away. Or it may happen in an hour or more. In extreme cases, the airways from mouth to lungs may swell and make breathing difficult. This is a medical emergency. Use epinephrine medicine if you have it, and call 911 or go to the emergency room.     When to call your healthcare provider  Call your healthcare provider if:    Your hives feel uncomfortable    You have never had hives before    Your symptoms don't go away or come back    Your symptoms get worse or new symptoms develop such as:   ? Sneezing, coughing, runny or stuffy nose  ? Itching of the eyes, nose, or roof of the mouth  ? Itching, burning, stinging, or pain  ? Dry, flaky, cracking, or scaly skin  ? Red or purple spots  Call 911  Call 911 right away if you have:    Swelling in your lips, tongue, or throat, called angioedema    Drooling    Trouble breathing, talking, or swallowing    Cool, moist or pale (blue in color) skin    Fast and weak heartbeat    Wheezing or short of breath    Feeling lightheaded or confused    Diarrhea    Severe nausea or vomiting    Seizure    Feeling dizzy or weak, or a sudden drop in blood pressure  Date Last Reviewed: 4/1/2017 2000-2019 [employer]. [address] [place] [address], [place] [address]. All rights reserved. This information is not intended as a substitute for professional medical care. Always follow your healthcare professional's instructions.           Patient Education     Hives (Adult)  Hives are pink or red bumps on the skin. These bumps are  also known as wheals. The bumps can itch, burn, or sting. Hives can occur anywhere on the body. They vary in size and shape and can form in clusters. Individual hives can appear and go away quickly. New hives may develop as old ones fade. Hives are common and usually harmless. Occasionally hives are a sign of a serious allergy.  Hives are often caused by an allergic reaction. It may be an allergic reaction to foods such as fruit, shellfish, chocolate, nuts, or tomatoes. It may be a reaction to pollens, animal fur, or mold spores. Medicines, chemicals, and insect bites can also cause hives. And hives can be caused by hot sun or cold air. The cause of hives can be difficult to find.  You may be given medicines to relieve swelling and itching. Follow all instructions when using these medicines. The hives will usually fade in a few days, but can last up to 2 weeks.  Home care  Follow these tips:    Try to find the cause of the hives and eliminate it. Discuss possible causes with your healthcare provider. Future reactions to the same allergen may be worse.    Don t scratch the hives. Scratching will delay healing. To reduce itching, apply cool, wet compresses to the skin.    Dress in soft, loose cotton clothing.    Don t bathe in hot water. This can make the itching worse.    Apply an ice pack or cool pack wrapped in a thin towel to your skin. This will help reduce redness and itching. But if your hives were caused by exposure to cold, then do not apply more cold to them.    You may use over-the counter antihistamines to reduce itching. Some older antihistamines, such as diphenhydramine and chlorpheniramine, are inexpensive. But they need to be taken often and may make you sleepy. They are best used at bedtime. Don t use diphenhydramine if you have glaucoma or have trouble urinating because of an enlarged prostate. Newer antihistamines, such as loratadine, cetirizine, and fexofenadine, are generally more expensive. But  they tend to have fewer side effects, such as drowsiness. They can be taken less often.    Another type of antihistamine is used to treat heartburn. This type includes ranitidine, nizatidine, famotidine, and cimetidine. These are sometimes used along with the above antihistamines if a single medicine is not working.  Follow-up care  Follow up with your healthcare provider if your symptoms don't get better in 2 days. Ask your provider about allergy testing if you have had a severe reaction, or have had several episodes of hives. He or she can use the allergy testing to find out what you are allergic to.  When to seek medical advice  Call your healthcare provider right away if any of these occur:    Fever of 100.4 F (38.0 C) or higher, or as directed by your healthcare provider    Redness, swelling, or pain    Foul-smelling fluid coming from the rash  Call 911  Call 911 if any of the following occur:    Swelling of the face, throat, or tongue    Trouble breathing or swallowing    Dizziness, weakness, or fainting  Date Last Reviewed: 9/1/2016 2000-2019 [employer]. [address] [place] [address]. All rights reserved. This information is not intended as a substitute for professional medical care. Always follow your healthcare professional's instructions.

## 2020-08-05 NOTE — PATIENT INSTRUCTIONS
Prednisone 40 mg 5 days    Zyrtec 10 mg daily  Benadryl 50 mg night.    Food & exposure diary  Viruses can also cause hives    gastroenteritis     Fluids  Easy to digest foods for 1 week.      Patient Education     Understanding Hives (Urticaria)    Urticaria (hives) are red, itchy, and swollen areas on the skin. They are most often an allergic reaction from eating a food or taking a medicine. Sometimes the cause may be unknown. A single hive can vary in size from a half inch to several inches. Hives can appear all over the body. Or they may appear on only one part of the body.  What causes hives?  Hives can be caused by food and beverages such as:    Tree nuts (almonds, walnuts, hazelnuts)    Peanuts    Eggs    Shellfish    Milk  Hives can also be caused by medicines such as:    Antibiotics, especially penicillin and sulfa-based medicines     Anticonvulsant or antiseizure medicines     Chemotherapy medicines   Other causes of hives include:    Infection or virus    Heat    Cold air or cold water    Exercise    Scratching or rubbing your skin, or wearing tight-fitting clothes that rub your skin    Being exposed to sunlight or light from a light bulb, in rare cases    Inhaled-chemicals in the environment from foods and drugs, insects, plants, or other sources  In some cases, hives may occur again and again with no specific cause.  If you have hives    Stay away from the food, drink, medicine, or other thing that may be causing the hives.    Ask your healthcare provider how to control itchy or irritated skin.    Talk with your healthcare provider right away if you think a medicine gave you hives.  Watch for anaphylaxis  If you have hives, watch for symptoms of a severe reaction that can affect your entire body. This is called anaphylaxis. Symptoms can include swollen areas of the body, wheezing, trouble breathing or swallowing, and a hoarse voice. This reaction may happen right away. Or it may happen in an hour or  more. In extreme cases, the airways from mouth to lungs may swell and make breathing difficult. This is a medical emergency. Use epinephrine medicine if you have it, and call 911 or go to the emergency room.     When to call your healthcare provider  Call your healthcare provider if:    Your hives feel uncomfortable    You have never had hives before    Your symptoms don't go away or come back    Your symptoms get worse or new symptoms develop such as:   ? Sneezing, coughing, runny or stuffy nose  ? Itching of the eyes, nose, or roof of the mouth  ? Itching, burning, stinging, or pain  ? Dry, flaky, cracking, or scaly skin  ? Red or purple spots  Call 911  Call 911 right away if you have:    Swelling in your lips, tongue, or throat, called angioedema    Drooling    Trouble breathing, talking, or swallowing    Cool, moist or pale (blue in color) skin    Fast and weak heartbeat    Wheezing or short of breath    Feeling lightheaded or confused    Diarrhea    Severe nausea or vomiting    Seizure    Feeling dizzy or weak, or a sudden drop in blood pressure  Date Last Reviewed: 4/1/2017 2000-2019 [employer]. [address] [place] [address]. All rights reserved. This information is not intended as a substitute for professional medical care. Always follow your healthcare professional's instructions.           Patient Education     Hives (Adult)  Hives are pink or red bumps on the skin. These bumps are also known as wheals. The bumps can itch, burn, or sting. Hives can occur anywhere on the body. They vary in size and shape and can form in clusters. Individual hives can appear and go away quickly. New hives may develop as old ones fade. Hives are common and usually harmless. Occasionally hives are a sign of a serious allergy.  Hives are often caused by an allergic reaction. It may be an allergic reaction to foods such as fruit, shellfish, chocolate, nuts, or tomatoes. It may be a reaction to  pollens, animal fur, or mold spores. Medicines, chemicals, and insect bites can also cause hives. And hives can be caused by hot sun or cold air. The cause of hives can be difficult to find.  You may be given medicines to relieve swelling and itching. Follow all instructions when using these medicines. The hives will usually fade in a few days, but can last up to 2 weeks.  Home care  Follow these tips:    Try to find the cause of the hives and eliminate it. Discuss possible causes with your healthcare provider. Future reactions to the same allergen may be worse.    Don t scratch the hives. Scratching will delay healing. To reduce itching, apply cool, wet compresses to the skin.    Dress in soft, loose cotton clothing.    Don t bathe in hot water. This can make the itching worse.    Apply an ice pack or cool pack wrapped in a thin towel to your skin. This will help reduce redness and itching. But if your hives were caused by exposure to cold, then do not apply more cold to them.    You may use over-the counter antihistamines to reduce itching. Some older antihistamines, such as diphenhydramine and chlorpheniramine, are inexpensive. But they need to be taken often and may make you sleepy. They are best used at bedtime. Don t use diphenhydramine if you have glaucoma or have trouble urinating because of an enlarged prostate. Newer antihistamines, such as loratadine, cetirizine, and fexofenadine, are generally more expensive. But they tend to have fewer side effects, such as drowsiness. They can be taken less often.    Another type of antihistamine is used to treat heartburn. This type includes ranitidine, nizatidine, famotidine, and cimetidine. These are sometimes used along with the above antihistamines if a single medicine is not working.  Follow-up care  Follow up with your healthcare provider if your symptoms don't get better in 2 days. Ask your provider about allergy testing if you have had a severe reaction, or  have had several episodes of hives. He or she can use the allergy testing to find out what you are allergic to.  When to seek medical advice  Call your healthcare provider right away if any of these occur:    Fever of 100.4 F (38.0 C) or higher, or as directed by your healthcare provider    Redness, swelling, or pain    Foul-smelling fluid coming from the rash  Call 911  Call 911 if any of the following occur:    Swelling of the face, throat, or tongue    Trouble breathing or swallowing    Dizziness, weakness, or fainting  Date Last Reviewed: 9/1/2016 2000-2019 [employer]. [address] [place] [address]. All rights reserved. This information is not intended as a substitute for professional medical care. Always follow your healthcare professional's instructions.

## 2020-08-05 NOTE — TELEPHONE ENCOUNTER
Called mother.  Pt is being seen at [facility] right now.  Sounds like good plan.  MARIE Rosas

## 2020-08-05 NOTE — TELEPHONE ENCOUNTER
Reason for call:  Patient reporting a symptom    Symptom or request: Hives all over body, nausea, 1 episode of emesis    Duration (how long have symptoms been present): 3 days, symptoms presented while camping    Have you been treated for this before? No    Additional comments:     Phone Number patient can be reached at:  Cell number on file:    Telephone Information:   Mobile [phone]       Best Time:      Can we leave a detailed message on this number:  YES    Call taken on 8/5/2020 at 12:30 PM by Sanjana Lovelace

## 2020-08-06 VITALS
HEART RATE: 90 BPM | OXYGEN SATURATION: 99 % | SYSTOLIC BLOOD PRESSURE: 109 MMHG | DIASTOLIC BLOOD PRESSURE: 66 MMHG | TEMPERATURE: 98.7 F | WEIGHT: 145 LBS | RESPIRATION RATE: 18 BRPM | BODY MASS INDEX: 22.05 KG/M2

## 2020-08-06 NOTE — DISCHARGE INSTRUCTIONS
Take additional dose of prednisone when you get home.  Take Allegra or Zyrtec as directed.  You may also take a 50 mg dose of Benadryl at night before sleep.  Use EpiPen only for severe allergic reaction including sensation of throat closing, difficulty swallowing, trouble breathing, or facial swelling.  If you need to use an EpiPen, report to the nearest emergency department for evaluation immediately.    Follow-up with allergist tomorrow as planned.    Return to the emergency department if throat swelling, trouble breathing, facial swelling, or other concerns.

## 2020-08-07 ENCOUNTER — NURSE TRIAGE (OUTPATIENT)
Dept: NURSING | Facility: CLINIC | Age: 20
End: 2020-08-07

## 2020-08-07 NOTE — TELEPHONE ENCOUNTER
Imelda Barlow CNFP DNP    Patient mother requesting covid-19 test for GI issues and rash--please see encounters from 8/5--patient was in UC and then ER    Mother of patient calling and is very upset covid-19 was not administered--although this would have been up to UC and ER teams    Can we get a covid-19 test for this patient?    Thanks! Sanjana Gamboa RN

## 2020-08-07 NOTE — TELEPHONE ENCOUNTER
"CLINIC ACTION NEEDED    Caller is pt's mother (Romi) together with pt present.  Pt had ED eval 8/5/2020 (48 hours ago).  Was given epinephrine and prednisone for apparent urticaria and gastroenteritis due to allergic reaction.    Mother now expresses anger that no covid test was administered or recommended.  States \"Rashes and GI issues are all listed as potential covid symptoms.\"  Pt has no severe symptoms at this time.  Feels well generally; some rash persists.    Assured mother that an order for PCR Covid swab test can be requested.  Can this be ordered today?  Patient will await callback -> [phone].  Hopes to be scheduled for the curbside testing toliya.    Maria Luz [facility] Nurse Advisor     Reason for Disposition    COVID-19 Testing, questions about    [1] COVID-19 diagnosed by positive lab test AND [2] mild symptoms (e.g., cough, fever, others) AND [3] no complications or SOB    Additional Information    Negative: SEVERE difficulty breathing (e.g., struggling for each breath, speaks in single words)    Negative: Difficult to awaken or acting confused (e.g., disoriented, slurred speech)    Negative: Bluish (or gray) lips or face now    Negative: Shock suspected (e.g., cold/pale/clammy skin, too weak to stand, low BP, rapid pulse)    Negative: Sounds like a life-threatening emergency to the triager    Negative: [1] COVID-19 exposure AND [2] no symptoms    Negative: COVID-19 and Breastfeeding, questions about    Negative: [1] Adult with possible COVID-19 symptoms AND [2] triager concerned about severity of symptoms or other causes    Negative: SEVERE or constant chest pain or pressure (Exception: mild central chest pain, present only when coughing)    Negative: MODERATE difficulty breathing (e.g., speaks in phrases, SOB even at rest, pulse 100-120)    Negative: Patient sounds very sick or weak to the triager    Negative: MILD difficulty breathing (e.g., minimal/no SOB at rest, SOB with walking, pulse " <100)    Negative: Chest pain or pressure    Negative: Fever > 103 F (39.4 C)    Negative: [1] Fever > 101 F (38.3 C) AND [2] age > 60    Negative: [1] Fever > 100.0 F (37.8 C) AND [2] bedridden (e.g., nursing home patient, CVA, chronic illness, recovering from surgery)    Negative: HIGH RISK patient (e.g., age > 64 years, diabetes, heart or lung disease, weak immune system)    Negative: Fever present > 3 days (72 hours)    Negative: [1] Fever returns after gone for over 24 hours AND [2] symptoms worse or not improved    Negative: [1] Continuous (nonstop) coughing interferes with work or school AND [2] no improvement using cough treatment per protocol    Negative: [1] COVID-19 infection suspected by caller or triager AND [2] mild symptoms (cough, fever, or others) AND [3] no complications or SOB    Negative: Cough present > 3 weeks    [facility] Specific Disposition  - [facility] Specific Patient Instructions  COVID 19 Nurse Triage Plan/Patient Instructions    Please be aware that novel coronavirus (COVID-19) may be circulating in the community. If you develop symptoms such as fever, cough, or SOB or if you have concerns about the presence of another infection including coronavirus (COVID-19), please contact your health care provider or visit [email or web address].       Home care recommended. Follow home care protocol based instructions.    Thank you for taking steps to prevent the spread of this virus.  Limit your contact with others.  Wear a simple mask to cover your cough.  Wash your hands well and often.    Resources  [facility]: About COVID-19: [email or web address]  CDC: What to Do If You're Sick: www.cdc.gov/coronavirus/2019-ncov/about/steps-when-sick.html  CDC: Ending Home Isolation: www.cdc.gov/coronavirus/2019-ncov/hcp/disposition-in-home-patients.html   CDC: Caring for Someone: www.cdc.gov/coronavirus/2019-ncov/if-you-are-sick/care-for-someone.html   [facility]: Interim Guidance for  Hospital Discharge to Home: www.health.[facility].[place].us/diseases/coronavirus/hcp/hospdischarge.pdf  [facility] clinical trials (COVID-19 research studies): [email or web address]-trials   Below are the COVID-19 hotlines at the [place] Department of Health ([facility]). Interpreters are available.   For health questions: Call 815-313-2564 or 1-285.727.2342 (7 a.m. to 7 p.m.)  For questions about schools and childcare: Call 507-437-5055 or 1-572.889.6448 (7 a.m. to 7 p.m.)    Protocols used: CORONAVIRUS (COVID-19) DIAGNOSED OR [id number]-IN 5.16.20

## 2020-08-09 NOTE — TELEPHONE ENCOUNTER
Per the last message, she did end up going to get a COVID test.  An allergist is the specialist for hives.  I would recommend following up with them if the hives do not improve.

## 2020-08-10 NOTE — TELEPHONE ENCOUNTER
Imelda- I am not seeing COVID test on her chart?  You do feel pt had COVID testing done?  MARIE Rosas

## 2020-12-29 ASSESSMENT — ENCOUNTER SYMPTOMS
PARESTHESIAS: 0
DIZZINESS: 1
HEARTBURN: 0
NERVOUS/ANXIOUS: 1
PALPITATIONS: 0
HEMATOCHEZIA: 0
ARTHRALGIAS: 0
HEMATURIA: 0
SHORTNESS OF BREATH: 0
CONSTIPATION: 0
SORE THROAT: 0
COUGH: 0
JOINT SWELLING: 0
ABDOMINAL PAIN: 0
BREAST MASS: 0
EYE PAIN: 0
FREQUENCY: 0
HEADACHES: 0
NAUSEA: 0
CHILLS: 0
DIARRHEA: 0
FEVER: 0
MYALGIAS: 0
DYSURIA: 0
WEAKNESS: 0

## 2020-12-30 ENCOUNTER — OFFICE VISIT (OUTPATIENT)
Dept: FAMILY MEDICINE | Facility: CLINIC | Age: 20
End: 2020-12-30
Payer: COMMERCIAL

## 2020-12-30 VITALS
HEART RATE: 91 BPM | WEIGHT: 160 LBS | RESPIRATION RATE: 18 BRPM | OXYGEN SATURATION: 97 % | SYSTOLIC BLOOD PRESSURE: 116 MMHG | DIASTOLIC BLOOD PRESSURE: 68 MMHG | BODY MASS INDEX: 24.33 KG/M2 | TEMPERATURE: 98.1 F

## 2020-12-30 DIAGNOSIS — Z00.00 ROUTINE GENERAL MEDICAL EXAMINATION AT A HEALTH CARE FACILITY: Primary | ICD-10-CM

## 2020-12-30 PROCEDURE — 90715 TDAP VACCINE 7 YRS/> IM: CPT | Performed by: NURSE PRACTITIONER

## 2020-12-30 PROCEDURE — 90651 9VHPV VACCINE 2/3 DOSE IM: CPT | Performed by: NURSE PRACTITIONER

## 2020-12-30 PROCEDURE — 87491 CHLMYD TRACH DNA AMP PROBE: CPT | Performed by: NURSE PRACTITIONER

## 2020-12-30 PROCEDURE — 90472 IMMUNIZATION ADMIN EACH ADD: CPT | Performed by: NURSE PRACTITIONER

## 2020-12-30 PROCEDURE — G0145 SCR C/V CYTO,THINLAYER,RESCR: HCPCS | Performed by: NURSE PRACTITIONER

## 2020-12-30 PROCEDURE — 99395 PREV VISIT EST AGE 18-39: CPT | Mod: 25 | Performed by: NURSE PRACTITIONER

## 2020-12-30 PROCEDURE — 90686 IIV4 VACC NO PRSV 0.5 ML IM: CPT | Performed by: NURSE PRACTITIONER

## 2020-12-30 PROCEDURE — 90471 IMMUNIZATION ADMIN: CPT | Performed by: NURSE PRACTITIONER

## 2020-12-30 ASSESSMENT — ENCOUNTER SYMPTOMS
HEMATURIA: 0
SORE THROAT: 0
PALPITATIONS: 0
DIZZINESS: 1
ARTHRALGIAS: 0
WEAKNESS: 0
CHILLS: 0
EYE PAIN: 0
ABDOMINAL PAIN: 0
HEADACHES: 0
NAUSEA: 0
DIARRHEA: 0
SHORTNESS OF BREATH: 0
FEVER: 0
FREQUENCY: 0
NERVOUS/ANXIOUS: 1
COUGH: 0
MYALGIAS: 0
DYSURIA: 0
JOINT SWELLING: 0
CONSTIPATION: 0

## 2020-12-30 NOTE — PROGRESS NOTES
SUBJECTIVE:   CC: Trice Fletcher is an 20 year old woman who presents for preventive health visit.       Patient has been advised of split billing requirements and indicates understanding:   Healthy Habits:     Getting at least 3 servings of Calcium per day:  Yes    Bi-annual eye exam:  NO    Dental care twice a year:  Yes    Sleep apnea or symptoms of sleep apnea:  None    Diet:  Vegetarian/vegan and Gluten-free/reduced    Frequency of exercise:  2-3 days/week    Duration of exercise:  30-45 minutes    Taking medications regularly:  Yes    Medication side effects:  Other    PHQ-2 Total Score: 1    Additional concerns today:  Yes              Today's PHQ-2 Score:   PHQ-2 ( 1999 Pfizer) 12/29/2020   Q1: Little interest or pleasure in doing things 1   Q2: Feeling down, depressed or hopeless 0   PHQ-2 Score 1   Q1: Little interest or pleasure in doing things Several days   Q2: Feeling down, depressed or hopeless Not at all   PHQ-2 Score 1       Abuse: Current or Past (Physical, Sexual or Emotional) - No  Do you feel safe in your environment? Yes        Social History     Tobacco Use     Smoking status: Never Smoker     Smokeless tobacco: Never Used   Substance Use Topics     Alcohol use: Yes     Comment: one drink per week          Alcohol Use 12/29/2020   Prescreen: >3 drinks/day or >7 drinks/week? No       Reviewed orders with patient.  Reviewed health maintenance and updated orders accordingly - Yes          Pertinent mammograms are reviewed under the imaging tab.  History of abnormal Pap smear: NO - age 21-29 PAP every 3 years recommended     Reviewed and updated as needed this visit by clinical staff  Tobacco  Allergies  Meds   Med Hx  Surg Hx  Fam Hx  Soc Hx        Reviewed and updated as needed this visit by Provider                    Review of Systems   Constitutional: Negative for chills and fever.   HENT: Negative for congestion, ear pain, hearing loss and sore throat.    Eyes: Negative for pain and  visual disturbance.   Respiratory: Negative for cough and shortness of breath.    Cardiovascular: Negative for chest pain and palpitations.   Gastrointestinal: Negative for abdominal pain, constipation, diarrhea and nausea.   Genitourinary: Negative for dysuria, frequency, genital sores, hematuria and urgency.   Musculoskeletal: Negative for arthralgias, joint swelling and myalgias.   Skin: Negative for rash.   Neurological: Positive for dizziness. Negative for weakness and headaches.   Psychiatric/Behavioral: The patient is nervous/anxious.           OBJECTIVE:   /68   Pulse 91   Temp 98.1  F (36.7  C) (Tympanic)   Resp 18   Wt 72.6 kg (160 lb)   LMP 12/05/2020 (Approximate)   SpO2 97%   BMI 24.33 kg/m    Physical Exam  GENERAL: healthy, alert and no distress  EYES: Eyes grossly normal to inspection, PERRL and conjunctivae and sclerae normal  HENT: ear canals and TM's normal, nose and mouth without ulcers or lesions  NECK: no adenopathy, no asymmetry, masses, or scars and thyroid normal to palpation  RESP: lungs clear to auscultation - no rales, rhonchi or wheezes  BREAST: normal without masses, tenderness or nipple discharge and no palpable axillary masses or adenopathy  CV: regular rate and rhythm, normal S1 S2, no S3 or S4, no murmur, click or rub, no peripheral edema and peripheral pulses strong  ABDOMEN: soft, nontender, no hepatosplenomegaly, no masses and bowel sounds normal   (female): normal female external genitalia, normal urethral meatus, vaginal mucosa pink, moist, well rugated, and normal cervix/adnexa/uterus without masses or discharge  MS: no gross musculoskeletal defects noted, no edema  SKIN: no suspicious lesions or rashes  NEURO: Normal strength and tone, mentation intact and speech normal  PSYCH: mentation appears normal, affect normal/bright        ASSESSMENT/PLAN:   (Z00.00) Routine general medical examination at a health care facility  (primary encounter diagnosis)  Comment:  "  Plan: PAP imaged thin layer screen only - recommended        age 21 - 24 years, CHLAMYDIA TRACHOMATIS PCR              Patient has been advised of split billing requirements and indicates understanding:   COUNSELING:  Reviewed preventive health counseling, as reflected in patient instructions    Estimated body mass index is 24.33 kg/m  as calculated from the following:    Height as of 8/5/20: 1.727 m (5' 8\").    Weight as of this encounter: 72.6 kg (160 lb).        Trice AGUILAR Chance reports that Trice Fletcher has never smoked. Trice Fletcher has never used smokeless tobacco.      Counseling Resources:  ATP IV Guidelines  Pooled Cohorts Equation Calculator  Breast Cancer Risk Calculator  BRCA-Related Cancer Risk Assessment: FHS-7 Tool  FRAX Risk Assessment  ICSI Preventive Guidelines  Dietary Guidelines for Americans, 2010  USDA's MyPlate  ASA Prophylaxis  Lung CA Screening    Tracy Barlow NP  [facility]  "

## 2020-12-30 NOTE — NURSING NOTE
Prior to immunization administration, verified patients identity using patient s name and date of birth. Please see Immunization Activity for additional information.     Screening Questionnaire for Adult Immunization    Are you sick today?   No   Do you have allergies to medications, food, a vaccine component or latex?   No   Have you ever had a serious reaction after receiving a vaccination?   No   Do you have a long-term health problem with heart, lung, kidney, or metabolic disease (e.g., diabetes), asthma, a blood disorder, no spleen, complement component deficiency, a cochlear implant, or a spinal fluid leak?  Are you on long-term aspirin therapy?   No   Do you have cancer, leukemia, HIV/AIDS, or any other immune system problem?   No   Do you have a parent, brother, or sister with an immune system problem?   No   In the past 3 months, have you taken medications that affect  your immune system, such as prednisone, other steroids, or anticancer drugs; drugs for the treatment of rheumatoid arthritis, Crohn s disease, or psoriasis; or have you had radiation treatments?   No   Have you had a seizure, or a brain or other nervous system problem?   No   During the past year, have you received a transfusion of blood or blood    products, or been given immune (gamma) globulin or antiviral drug?   No   For women: Are you pregnant or is there a chance you could become       pregnant during the next month?   No   Have you received any vaccinations in the past 4 weeks?   No     Immunization questionnaire answers were all negative.        Per orders of Dr. Barlow, injection of Flu, Adacel, HPV given by Nelly Jefferson CMA. Patient instructed to remain in clinic for 15 minutes afterwards, and to report any adverse reaction to me immediately.       Screening performed by Nelly Jefferson CMA on 12/30/2020 at 2:08 PM.

## 2020-12-31 LAB
C TRACH DNA SPEC QL NAA+PROBE: NEGATIVE
SPECIMEN SOURCE: NORMAL

## 2021-01-04 LAB
COPATH REPORT: NORMAL
PAP: NORMAL

## 2021-09-05 ENCOUNTER — HEALTH MAINTENANCE LETTER (OUTPATIENT)
Age: 21
End: 2021-09-05

## 2022-02-20 ENCOUNTER — HEALTH MAINTENANCE LETTER (OUTPATIENT)
Age: 22
End: 2022-02-20

## 2022-10-23 ENCOUNTER — HEALTH MAINTENANCE LETTER (OUTPATIENT)
Age: 22
End: 2022-10-23

## 2022-11-19 ENCOUNTER — OFFICE VISIT (OUTPATIENT)
Dept: URGENT CARE | Facility: URGENT CARE | Age: 22
End: 2022-11-19
Payer: COMMERCIAL

## 2022-11-19 VITALS
TEMPERATURE: 100.9 F | HEART RATE: 70 BPM | DIASTOLIC BLOOD PRESSURE: 67 MMHG | SYSTOLIC BLOOD PRESSURE: 100 MMHG | OXYGEN SATURATION: 100 %

## 2022-11-19 DIAGNOSIS — J06.9 VIRAL URI WITH COUGH: Primary | ICD-10-CM

## 2022-11-19 LAB
DEPRECATED S PYO AG THROAT QL EIA: NEGATIVE
FLUAV AG SPEC QL IA: NEGATIVE
FLUBV AG SPEC QL IA: NEGATIVE

## 2022-11-19 PROCEDURE — 87651 STREP A DNA AMP PROBE: CPT | Performed by: PHYSICIAN ASSISTANT

## 2022-11-19 PROCEDURE — U0003 INFECTIOUS AGENT DETECTION BY NUCLEIC ACID (DNA OR RNA); SEVERE ACUTE RESPIRATORY SYNDROME CORONAVIRUS 2 (SARS-COV-2) (CORONAVIRUS DISEASE [COVID-19]), AMPLIFIED PROBE TECHNIQUE, MAKING USE OF HIGH THROUGHPUT TECHNOLOGIES AS DESCRIBED BY CMS-2020-01-R: HCPCS | Performed by: PHYSICIAN ASSISTANT

## 2022-11-19 PROCEDURE — 99213 OFFICE O/P EST LOW 20 MIN: CPT | Mod: CS | Performed by: PHYSICIAN ASSISTANT

## 2022-11-19 PROCEDURE — 87804 INFLUENZA ASSAY W/OPTIC: CPT | Performed by: PHYSICIAN ASSISTANT

## 2022-11-19 PROCEDURE — U0005 INFEC AGEN DETEC AMPLI PROBE: HCPCS | Performed by: PHYSICIAN ASSISTANT

## 2022-11-19 NOTE — PROGRESS NOTES
Assessment & Plan     1. Viral URI with cough  On exam, lungs are CTAB without sign of respiratory distress. Throat without PTA or RPA and TM clear B/L. No nuchal rigidity.  Suspect viral URI, influ and strep negative. COVID pending  Encouraged fluids, rest and humidified air at night  - Streptococcus A Rapid Screen w/Reflex to PCR - Clinic Collect  - Influenza A & B Antigen - Clinic Collect  - Symptomatic; Unknown COVID-19 Virus (Coronavirus) by PCR Nasopharyngeal  - Group A Streptococcus PCR Throat Swab          Return in about 3 days (around 11/22/2022).    Diagnosis and treatment plan was reviewed with patient and/or family.   We went over any labs or imaging. Discussed worsening symptoms or little to no relief despite treatment plan to follow-up with PCP or return to clinic.  Patient verbalizes understanding. All questions were addressed and answered.     Haydee Lacy PA-C  [facility]    CHIEF COMPLAINT:   Chief Complaint   Patient presents with     Throat Problem     X's 2-3 days      Subjective     Trice is a 22 year old adult who presents to clinic today for evaluation of sore throat. Symptoms started 2-3 days ago. Has runny nose, congestion and cough. Fever started in the past couple of days. Endorses body aches.       Past Medical History:   Diagnosis Date     Contact dermatitis and other eczema, due to unspecified cause      No past surgical history on file.  Social History     Tobacco Use     Smoking status: Never     Smokeless tobacco: Never   Substance Use Topics     Alcohol use: Yes     Comment: one drink per week      Current Outpatient Medications   Medication     EPINEPHrine (ANY BX GENERIC EQUIV) 0.3 MG/0.3ML injection 2-pack     FISH OIL     fluticasone (FLONASE) 50 MCG/ACT nasal spray     spironolactone (ALDACTONE) 50 MG tablet     UNKNOWN TO PATIENT     VITAMIN D, CHOLECALCIFEROL, PO     No current facility-administered medications for this visit.     No  Known Allergies    10 point ROS of systems were all negative except for pertinent positives noted in my HPI.      Exam:   /67   Pulse 70   Temp (!) 100.9  F (38.3  C) (Temporal)   SpO2 100%   Constitutional: healthy, alert and no distress  Head: Normocephalic, atraumatic.  Eyes: conjunctiva clear, no drainage  ENT: TMs clear and shiny srikanth, nasal mucosa pink and moist, throat is erythematous. No trismus or drooling.   Neck: neck is supple, no cervical lymphadenopathy or nuchal rigidity  Cardiovascular: RRR  Respiratory: CTA bilaterally, no rhonchi or rales  Skin: no rashes  Neurologic: Speech clear, gait normal. Moves all extremities.    Results for orders placed or performed in visit on 11/19/22   Streptococcus A Rapid Screen w/Reflex to PCR - Clinic Collect     Status: Normal    Specimen: Throat; Swab   Result Value Ref Range    Group A Strep antigen Negative Negative   Influenza A & B Antigen - Clinic Collect     Status: Normal    Specimen: Nasopharyngeal; Swab   Result Value Ref Range    Influenza A antigen Negative Negative    Influenza B antigen Negative Negative    Narrative    Test results must be correlated with clinical data. If necessary, results should be confirmed by a molecular assay or viral culture.

## 2022-11-19 NOTE — PATIENT INSTRUCTIONS
I suspect you have a viral infection  Your strep and flu tests are negative, we send the strep test for a backup test and will let you know if that is positive    Fluids, rest and humidified air at night  Tylenol / Ibuprofen for comfort and fever  OK to take mucinex for cough  Salt water gargles for Sore throat

## 2022-11-20 LAB — GROUP A STREP BY PCR: NOT DETECTED

## 2022-11-21 LAB — SARS-COV-2 RNA RESP QL NAA+PROBE: POSITIVE

## 2023-04-02 ENCOUNTER — HEALTH MAINTENANCE LETTER (OUTPATIENT)
Age: 23
End: 2023-04-02

## 2023-05-23 ENCOUNTER — OFFICE VISIT (OUTPATIENT)
Dept: URGENT CARE | Facility: URGENT CARE | Age: 23
End: 2023-05-23
Payer: COMMERCIAL

## 2023-05-23 VITALS
BODY MASS INDEX: 20.73 KG/M2 | HEIGHT: 69 IN | TEMPERATURE: 98.2 F | OXYGEN SATURATION: 99 % | HEART RATE: 77 BPM | WEIGHT: 140 LBS | DIASTOLIC BLOOD PRESSURE: 66 MMHG | SYSTOLIC BLOOD PRESSURE: 100 MMHG

## 2023-05-23 DIAGNOSIS — R07.0 THROAT PAIN: Primary | ICD-10-CM

## 2023-05-23 PROBLEM — S27.0XXA TRAUMATIC PNEUMOTHORAX: Status: ACTIVE | Noted: 2018-03-12

## 2023-05-23 PROBLEM — S27.322A BILATERAL PULMONARY CONTUSION: Status: ACTIVE | Noted: 2018-03-12

## 2023-05-23 PROBLEM — S22.41XA CLOSED FRACTURE OF MULTIPLE RIBS OF RIGHT SIDE: Status: ACTIVE | Noted: 2018-03-12

## 2023-05-23 PROBLEM — S36.113A LACERATION OF LIVER: Status: ACTIVE | Noted: 2018-03-12

## 2023-05-23 LAB
DEPRECATED S PYO AG THROAT QL EIA: NEGATIVE
GROUP A STREP BY PCR: NOT DETECTED

## 2023-05-23 PROCEDURE — 87651 STREP A DNA AMP PROBE: CPT | Performed by: NURSE PRACTITIONER

## 2023-05-23 PROCEDURE — 99213 OFFICE O/P EST LOW 20 MIN: CPT | Performed by: NURSE PRACTITIONER

## 2023-05-23 RX ORDER — LAMOTRIGINE 200 MG/1
200 TABLET ORAL DAILY
COMMUNITY

## 2023-05-23 RX ORDER — ARIPIPRAZOLE 2 MG/1
1 TABLET ORAL
COMMUNITY
Start: 2023-05-03

## 2023-05-23 RX ORDER — FLUOCINONIDE 0.5 MG/G
CREAM TOPICAL
COMMUNITY
Start: 2023-04-28

## 2023-05-23 NOTE — PATIENT INSTRUCTIONS
Rapid strep test today is negative.   Your throat culture is pending. Urgent Care will call if positive results to start antibiotics at that time; No call if the culture is negative.  Drink plenty of fluids and rest.  May use salt water gargles- about 8 oz warm water with about 1 teaspoon salt  Sucrets and Cepacol spray are over the counter medications that numb the throat.  Over the counter pain relievers such as tylenol or ibuprofen may be used as needed.   Mucinex is product known to help loosen congestion and thin mucus (generic is guaifenesin)   Delsym 12 hour over the counter works well for cough.  Honey has been shown to be helpful in cough management and is soothing to a sore throat. May add to lemon tea.  Please follow up with primary care provider if not improving, worsening or new symptoms.

## 2023-05-23 NOTE — PROGRESS NOTES
"Chief Complaint   Patient presents with     Urgent Care     Cough     Throat Pain     SUBJECTIVE:  Trice Fletcher is a 22 year old adult presenting with cough sore throat mucus for 3 days.  Declines fever sinus pain shortness of breath chest pain. No known sick contacts.  Declines asthma or smoking.    Past Medical History:   Diagnosis Date     Contact dermatitis and other eczema, due to unspecified cause      EPINEPHrine (ANY BX GENERIC EQUIV) 0.3 MG/0.3ML injection 2-pack, Inject 0.3 mLs (0.3 mg) into the muscle once as needed for anaphylaxis  lamoTRIgine (LAMICTAL) 200 MG tablet, Take 200 mg by mouth daily  PREDNISONE PO,   spironolactone (ALDACTONE) 50 MG tablet, Take 50 mg by mouth daily  VITAMIN D, CHOLECALCIFEROL, PO, Take 5,000 Units by mouth daily  ARIPiprazole (ABILIFY) 2 MG tablet, Take 1 tablet by mouth daily at 2 pm  FISH OIL, 1 tab daily (Patient not taking: Reported on 5/23/2023)  fluocinonide (LIDEX) 0.05 % external cream, APPLY TWICE A DAY TO THE PSORIATIC PLAQUES  fluticasone (FLONASE) 50 MCG/ACT nasal spray, Spray 1-2 sprays into both nostrils daily (Patient not taking: Reported on 5/23/2023)  UNKNOWN TO PATIENT,   [DISCONTINUED] levonorgestrel-ethinyl estradiol (AVIANE,ALESSE,LESSINA) 0.1-20 MG-MCG per tablet, Take 1 tablet by mouth daily    No current facility-administered medications on file prior to visit.    Social History     Tobacco Use     Smoking status: Never     Smokeless tobacco: Never   Vaping Use     Vaping status: Not on file   Substance Use Topics     Alcohol use: Yes     Comment: one drink per week      No Known Allergies    Review of Systems   All systems negative except for those listed above in HPI.    OBJECTIVE:   /66   Pulse 77   Temp 98.2  F (36.8  C) (Temporal)   Ht 1.753 m (5' 9\")   Wt 63.5 kg (140 lb)   SpO2 99%   BMI 20.67 kg/m       Physical Exam  Constitutional:       General: Trice Fletcher is not in acute distress.     Appearance: Normal appearance. Trice" JEFF Fletcher is well-developed. Trice Fletcher is not ill-appearing, toxic-appearing or diaphoretic.   HENT:      Right Ear: Tympanic membrane and ear canal normal.      Left Ear: Tympanic membrane and ear canal normal.      Nose: Nose normal.      Mouth/Throat:      Mouth: Mucous membranes are moist.      Pharynx: Oropharynx is clear. No oropharyngeal exudate or posterior oropharyngeal erythema.   Cardiovascular:      Pulses: Normal pulses.   Pulmonary:      Effort: Pulmonary effort is normal. No respiratory distress.      Breath sounds: Normal breath sounds. No stridor. No wheezing, rhonchi or rales.   Chest:      Chest wall: No tenderness.   Abdominal:      General: Bowel sounds are normal.      Palpations: Abdomen is soft.      Tenderness: There is no abdominal tenderness.   Musculoskeletal:         General: Normal range of motion.   Skin:     General: Skin is warm and dry.      Capillary Refill: Capillary refill takes less than 2 seconds.      Coloration: Skin is not pale.   Neurological:      General: No focal deficit present.      Mental Status: Trice Fletcher is alert and oriented to person, place, and time.   Psychiatric:         Mood and Affect: Mood normal.         Behavior: Behavior normal.       Results for orders placed or performed in visit on 05/23/23   Streptococcus A Rapid Screen w/Reflex to PCR - Clinic Collect     Status: Normal    Specimen: Throat; Swab   Result Value Ref Range    Group A Strep antigen Negative Negative     ASSESSMENT:    ICD-10-CM    1. Throat pain  R07.0 Streptococcus A Rapid Screen w/Reflex to PCR - Clinic Collect     Group A Streptococcus PCR Throat Swab        PLAN:     Rapid strep test today is negative.   Your throat culture is pending. Urgent Care will call if positive results to start antibiotics at that time; No call if the culture is negative.  Drink plenty of fluids and rest.  May use salt water gargles- about 8 oz warm water with about 1 teaspoon salt  Sucrets and Cepacol  spray are over the counter medications that numb the throat.  Over the counter pain relievers such as tylenol or ibuprofen may be used as needed.   Mucinex is product known to help loosen congestion and thin mucus (generic is guaifenesin)   Delsym 12 hour over the counter works well for cough.  Honey has been shown to be helpful in cough management and is soothing to a sore throat. May add to lemon tea.  Please follow up with primary care provider if not improving, worsening or new symptoms.    Follow up with primary care provider with any problems, questions or concerns or if symptoms worsen or fail to improve. Patient agreed to plan and verbalized understanding.    CHEPE Cruz-[facility]

## 2023-12-24 ENCOUNTER — OFFICE VISIT (OUTPATIENT)
Dept: URGENT CARE | Facility: URGENT CARE | Age: 23
End: 2023-12-24
Payer: COMMERCIAL

## 2023-12-24 ENCOUNTER — ANCILLARY PROCEDURE (OUTPATIENT)
Dept: GENERAL RADIOLOGY | Facility: CLINIC | Age: 23
End: 2023-12-24
Payer: COMMERCIAL

## 2023-12-24 VITALS
HEART RATE: 63 BPM | DIASTOLIC BLOOD PRESSURE: 61 MMHG | SYSTOLIC BLOOD PRESSURE: 102 MMHG | OXYGEN SATURATION: 98 % | BODY MASS INDEX: 22.45 KG/M2 | WEIGHT: 152 LBS | TEMPERATURE: 98.1 F

## 2023-12-24 DIAGNOSIS — M53.3 PAIN IN THE COCCYX: Primary | ICD-10-CM

## 2023-12-24 DIAGNOSIS — K59.00 CONSTIPATION, UNSPECIFIED CONSTIPATION TYPE: ICD-10-CM

## 2023-12-24 PROCEDURE — 72220 X-RAY EXAM SACRUM TAILBONE: CPT | Mod: TC | Performed by: RADIOLOGY

## 2023-12-24 PROCEDURE — 99213 OFFICE O/P EST LOW 20 MIN: CPT

## 2023-12-24 NOTE — PATIENT INSTRUCTIONS
Polyethylene glycol (miralax) to help w/ stool build up  -stay well hydrated  -increase fiber intake  -stay active     Will follow up w/ final read of Xray and whether further direction necessary.

## 2023-12-24 NOTE — PROGRESS NOTES
Assessment & Plan     Pain in the coccyx  Xray to further evaluate. Discussed w/ patient, no obvious fracture noted however may have some stool build up that may be causing patient discomfort. However, will await radiology read for finalization. In the meantime, would like patient to treat for constipation w/ OTC miralax, push fluids, increase fiber, and staying active. Will follow up w/ final Xray read and whether further direction is necessary. Patient fully understands and is agreeable with plan of care, at this point patient will follow up as needed unless acute concerns arise in the meantime.  - XR Sacrum and Coccyx 2 Views    Constipation, unspecified constipation type  As above.         No follow-ups on file.    RICARDO Payan [facility]  [facility] ALEXANDRE Carnes is a 23 year old adult who presents to clinic today for the following health issues:  Chief Complaint   Patient presents with    Urgent Care    Tailbone Pain     Tailbone pain x1week, getting worse. No injury.      HPI    Noting some tailbone discomfort that has spontaneously occurred a week ago  No notable trauma or exercise that may have induced  Patient has no hx of spine issues.   Patient feels BM are regular, says she has one daily, does not feels she has to strain  No fever, chills, myalgias.     Review of Systems  Constitutional, GI, musculoskeletal systems are negative, except as otherwise noted.      Objective    /61   Pulse 63   Temp 98.1  F (36.7  C) (Tympanic)   Wt 68.9 kg (152 lb)   SpO2 98%   BMI 22.45 kg/m    Physical Exam  Vitals and nursing note reviewed.   Constitutional:       General: Trice Fletcher is not in acute distress.     Appearance: Normal appearance. rTice Fletcher is not ill-appearing.   Cardiovascular:      Rate and Rhythm: Normal rate.   Pulmonary:      Effort: Pulmonary effort is normal.   Abdominal:      Palpations: Abdomen is soft.      Tenderness: There is no abdominal  tenderness. There is no right CVA tenderness, left CVA tenderness or guarding.   Musculoskeletal:      Cervical back: Normal.      Thoracic back: Normal.      Lumbar back: Tenderness and bony tenderness present. No signs of trauma or spasms. Normal range of motion. Negative right straight leg raise test and negative left straight leg raise test. No scoliosis.      Comments: Has some discomfort w/ lying down and palpation on tailbone/coccyx.   Skin:     General: Skin is warm and dry.   Neurological:      Mental Status: Trice Fletcher is alert.   Psychiatric:         Mood and Affect: Mood normal.         Behavior: Behavior normal.         Thought Content: Thought content normal.         Judgment: Judgment normal.         No results found for this or any previous visit (from the past 24 hour(s)).

## 2023-12-29 ENCOUNTER — OFFICE VISIT (OUTPATIENT)
Dept: URGENT CARE | Facility: URGENT CARE | Age: 23
End: 2023-12-29
Payer: COMMERCIAL

## 2023-12-29 VITALS
HEART RATE: 74 BPM | BODY MASS INDEX: 22.45 KG/M2 | TEMPERATURE: 97.8 F | SYSTOLIC BLOOD PRESSURE: 116 MMHG | WEIGHT: 152 LBS | DIASTOLIC BLOOD PRESSURE: 72 MMHG | OXYGEN SATURATION: 100 %

## 2023-12-29 DIAGNOSIS — K61.2 ABSCESS OF ANAL OR RECTAL REGION: Primary | ICD-10-CM

## 2023-12-29 PROCEDURE — 87070 CULTURE OTHR SPECIMN AEROBIC: CPT | Performed by: NURSE PRACTITIONER

## 2023-12-29 PROCEDURE — 10061 I&D ABSCESS COMP/MULTIPLE: CPT | Performed by: NURSE PRACTITIONER

## 2023-12-29 PROCEDURE — 87077 CULTURE AEROBIC IDENTIFY: CPT | Performed by: NURSE PRACTITIONER

## 2023-12-29 RX ORDER — CEPHALEXIN 500 MG/1
500 CAPSULE ORAL 4 TIMES DAILY
Qty: 40 CAPSULE | Refills: 0 | Status: SHIPPED | OUTPATIENT
Start: 2023-12-29 | End: 2024-01-08

## 2023-12-29 NOTE — PROGRESS NOTES
SUBJECTIVE:   Trice Fletcher is a 23 year old adult presenting with a chief complaint of   Chief Complaint   Patient presents with    Urgent Care    Abscess     C/O abscess on tailbone for 1 week       Trice Fletcher is an established patient of [facility].    She is here today for possible abscess on her tail bone, it started a couple day ago and she was seen in  and they thought it was tailbone pain and she feels it has developed into an abscess. It is not draining, she states it is painful, hard to sit, she has not touched it or tried to drain it. She did take some ibuprofen but that is not taking away all the pain. She has never had anything like this before.       Review of Systems   ROS: 10 point ROS neg other than the symptoms noted above in the HPI.    Past Medical History:   Diagnosis Date    Contact dermatitis and other eczema, due to unspecified cause      Family History   Problem Relation Age of Onset    Hypertension Maternal Grandmother         great    Cerebrovascular Disease Maternal Grandmother         great    Breast Cancer Maternal Grandmother         great    Cerebrovascular Disease Maternal Grandfather         great    Diabetes Paternal Uncle     C.A.D. No family hx of     Cancer - colorectal No family hx of      Current Outpatient Medications   Medication Sig Dispense Refill    ARIPiprazole (ABILIFY) 2 MG tablet Take 1 tablet by mouth daily at 2 pm      cephALEXin (KEFLEX) 500 MG capsule Take 1 capsule (500 mg) by mouth 4 times daily for 10 days 40 capsule 0    Cod Liver Oil 5000-500 UNIT/5ML OIL       doxycycline hyclate (VIBRAMYCIN) 100 MG capsule Take 100 mg by mouth daily      EPINEPHrine (ANY BX GENERIC EQUIV) 0.3 MG/0.3ML injection 2-pack Inject 0.3 mLs (0.3 mg) into the muscle once as needed for anaphylaxis 1 each 0    fluocinonide (LIDEX) 0.05 % external cream APPLY TWICE A DAY TO THE PSORIATIC PLAQUES      lamoTRIgine (LAMICTAL) 200 MG tablet Take 200 mg by mouth daily      PREDNISONE PO        spironolactone (ALDACTONE) 50 MG tablet Take 50 mg by mouth daily      tretinoin (RETIN-A) 0.025 % external cream APPLY TOPICALLY ONCE A DAY (AT BEDTIME)      triamcinolone (KENALOG) 0.025 % cream APPLY 2 TIMES A DAY TO THE IRRITATED SKIN OF THE FACE      VITAMIN D, CHOLECALCIFEROL, PO Take 5,000 Units by mouth daily       Social History     Tobacco Use    Smoking status: Never     Passive exposure: Never    Smokeless tobacco: Never   Substance Use Topics    Alcohol use: Yes     Comment: one drink per week        OBJECTIVE  /72   Pulse 74   Temp 97.8  F (36.6  C) (Tympanic)   Wt 68.9 kg (152 lb)   LMP 12/15/2023   SpO2 100%   BMI 22.45 kg/m      Physical Exam  Constitutional:       Appearance: Trice Fletcher is well-developed and well-groomed.   Skin:            Comments: Red warm noted right buttock abscess, the area was cleaned with betadine and then 1& lido with epi used, 11 blade scaple was used to drain abscess, noted copious aboutn of purulent drainage with blood, wound was packed with 1/4 ich packing and covered with gauze and tegaderm    Psychiatric:         Behavior: Behavior is cooperative.         Labs:  No results found for this or any previous visit (from the past 24 hour(s)).    X-Ray was not done.    ASSESSMENT:      ICD-10-CM    1. Abscess of anal or rectal region  K61.2 cephALEXin (KEFLEX) 500 MG capsule     Cyst Aerobic Bacterial Culture Routine Without Gram Stain     Skin Abscess, Simple [[id number]]         - see procedure note  - cover with antibiotics  - culture done  - wound care education given  Followup:    If not improving or if condition worsens, follow up with your Primary Care Provider    There are no Patient Instructions on file for this visit.

## 2024-01-02 ENCOUNTER — TELEPHONE (OUTPATIENT)
Dept: FAMILY MEDICINE | Facility: CLINIC | Age: 24
End: 2024-01-02
Payer: COMMERCIAL

## 2024-01-02 NOTE — TELEPHONE ENCOUNTER
Was seen in  12/29/23, having trouble with wound care.    Patient states they drained the abscess and she was sent with gauze and bandaging to care for the wound  -has re-dressed the wound but has not removed or changed the packing  -no specific packing or would care instructions patient could verbalize back to me; patient states wound education was verbally relayed and she is unsure what to do aside from changing the exterior dressing. Writer unable to locate any specific written instructions on whether packing should be changed.  Overall pt feels like wound is improving but has not been able to remove the packing/unsure if maybe the packing fell out.    Offered patient an RN visit for tomorrow to have the wound assessed and re-dressed or packed if needed. The patient indicates understanding of these issues and agrees with the plan.    JOSE ROBERTO Thornton BSN, RN  [facility]

## 2024-01-03 ENCOUNTER — ALLIED HEALTH/NURSE VISIT (OUTPATIENT)
Dept: FAMILY MEDICINE | Facility: CLINIC | Age: 24
End: 2024-01-03
Payer: COMMERCIAL

## 2024-01-03 DIAGNOSIS — Z51.89 VISIT FOR WOUND CARE: Primary | ICD-10-CM

## 2024-01-03 LAB
BACTERIA FLD CULT: ABNORMAL

## 2024-01-03 PROCEDURE — 99207 PR NO CHARGE NURSE ONLY: CPT

## 2024-01-03 NOTE — PROGRESS NOTES
Huddled with Imelda Barlow.  Discussed wound care.  Will assess today with RNs.  Repack.  Will have pt seen again on 1/5/24 to repack.  If still having drainage, will repack and be seen again on Monday 1/8/24.  If no drainage, PCP says ok to let close.  PCP can reassess, if needed.  PT agreeable with coming back to have this dressing completed by RNs.    Appt made for 1/5/24.  Will make the 1/8/24, if needed.  PT sent home with dressing supplies of guaze and tegaderm.  PT can shower with this dressing in place.  MARIE Rosas

## 2024-01-03 NOTE — PROGRESS NOTES
"Patient came in for wound check/care.   Patient was seen in urgent care on 12/29/2023 for abscess of anal or rectal region.   Abscess was lanced at that time, packed, and covered with gauze and tegaderm.     Wound assessment:   Wound size.  0.5 cm x 0.2 x 1.2 cm.   Slight serous drainage. No odor.   No tunneling noted.   Lorna wound skin: no redness, no breakdown.     Wound care:   Repacked wound with 6 cm of 1/4\" plain packing strip.   Covered with gauze and Tegaderm.     Patient will return on Friday for assessment of wound with nurse.     Belen Rouse, BSN RN  [facility]    "

## 2024-01-05 ENCOUNTER — ALLIED HEALTH/NURSE VISIT (OUTPATIENT)
Dept: FAMILY MEDICINE | Facility: CLINIC | Age: 24
End: 2024-01-05
Payer: COMMERCIAL

## 2024-01-05 DIAGNOSIS — Z51.89 VISIT FOR WOUND CARE: Primary | ICD-10-CM

## 2024-01-05 PROCEDURE — 99207 PR NO CHARGE NURSE ONLY: CPT

## 2024-01-05 NOTE — PROGRESS NOTES
Patient came in for wound check/care.   Patient was seen in urgent care on 12/29/2023 for abscess of anal or rectal region.   Abscess was lanced at that time, packed, and covered with gauze and tegaderm.      Wound assessment:   Wound size.  1 cm x 0.3 x 1.2 cm.   Moderate serosanguinous drainage on bandage. No odor.   No tunneling noted. Packing not in place.   Lorna wound skin: no redness, no breakdown.   Skin had slight red abrasion inferior to wound. No concerns at this time.      Wound care:   Cleansed wound with sterile normal saline on sterile 4x4 gauze. Patted wound. Placed bacitracin zinc ointment into/on wound. Covered with large band aid.    Patient will return on Monday for assessment of wound with nurse.       Belen Rouse, BSN MARIE  [facility]

## 2024-01-08 ENCOUNTER — ALLIED HEALTH/NURSE VISIT (OUTPATIENT)
Dept: FAMILY MEDICINE | Facility: CLINIC | Age: 24
End: 2024-01-08
Payer: COMMERCIAL

## 2024-01-08 DIAGNOSIS — Z51.89 VISIT FOR WOUND CARE: Primary | ICD-10-CM

## 2024-01-08 PROCEDURE — 99207 PR NO CHARGE NURSE ONLY: CPT

## 2024-01-08 NOTE — PROGRESS NOTES
Patient came in for wound check/care.   Patient was seen in urgent care on 12/29/2023 for abscess of anal or rectal region.   Abscess was lanced at that time, packed, and covered with gauze and tegaderm.      Wound assessment:   Wound size.  1 cm in length. Closed wound. No drainage on bandaid that had been changed by pt on 1/7/24.  No drainage. No odor.   No packing being done anymore.  Lorna wound skin: no redness, no breakdown.   Less itching to area per pt. No concerns at this time.      Wound care:   This wound care was performed today.  Cleansed wound with sterile normal saline on sterile 4x4 gauze. Patted wound. Placed bacitracin zinc ointment into/on wound. Covered with large band aid.    Gentle cleaning in shower. OK to place bacitracin on wound. May not even need a bandaid in the next 2 days. Bandaid may just offer protection.  Pt agreeable with plan.    Alison Barahona RN-[facility]

## 2024-02-15 ENCOUNTER — OFFICE VISIT (OUTPATIENT)
Dept: FAMILY MEDICINE | Facility: CLINIC | Age: 24
End: 2024-02-15
Payer: COMMERCIAL

## 2024-02-15 VITALS
DIASTOLIC BLOOD PRESSURE: 63 MMHG | RESPIRATION RATE: 15 BRPM | WEIGHT: 147 LBS | BODY MASS INDEX: 22.28 KG/M2 | HEART RATE: 98 BPM | SYSTOLIC BLOOD PRESSURE: 98 MMHG | TEMPERATURE: 97.9 F | HEIGHT: 68 IN | OXYGEN SATURATION: 98 %

## 2024-02-15 DIAGNOSIS — Z12.4 CERVICAL CANCER SCREENING: ICD-10-CM

## 2024-02-15 DIAGNOSIS — J01.90 ACUTE SINUSITIS WITH SYMPTOMS > 10 DAYS: ICD-10-CM

## 2024-02-15 DIAGNOSIS — Z00.00 ROUTINE GENERAL MEDICAL EXAMINATION AT A HEALTH CARE FACILITY: Primary | ICD-10-CM

## 2024-02-15 DIAGNOSIS — Z91.018 NUT ALLERGY: ICD-10-CM

## 2024-02-15 DIAGNOSIS — F33.1 MODERATE EPISODE OF RECURRENT MAJOR DEPRESSIVE DISORDER (H): ICD-10-CM

## 2024-02-15 PROCEDURE — 90471 IMMUNIZATION ADMIN: CPT | Performed by: PHYSICIAN ASSISTANT

## 2024-02-15 PROCEDURE — 90651 9VHPV VACCINE 2/3 DOSE IM: CPT | Performed by: PHYSICIAN ASSISTANT

## 2024-02-15 PROCEDURE — 99395 PREV VISIT EST AGE 18-39: CPT | Mod: 25 | Performed by: PHYSICIAN ASSISTANT

## 2024-02-15 PROCEDURE — 99213 OFFICE O/P EST LOW 20 MIN: CPT | Mod: 25 | Performed by: PHYSICIAN ASSISTANT

## 2024-02-15 PROCEDURE — 96127 BRIEF EMOTIONAL/BEHAV ASSMT: CPT | Performed by: PHYSICIAN ASSISTANT

## 2024-02-15 RX ORDER — FLUTICASONE PROPIONATE 50 MCG
1 SPRAY, SUSPENSION (ML) NASAL DAILY
Qty: 18.2 ML | Refills: 1 | Status: SHIPPED | OUTPATIENT
Start: 2024-02-15

## 2024-02-15 RX ORDER — EPINEPHRINE 0.3 MG/.3ML
0.3 INJECTION SUBCUTANEOUS
Qty: 1 EACH | Refills: 0 | Status: SHIPPED | OUTPATIENT
Start: 2024-02-15

## 2024-02-15 SDOH — HEALTH STABILITY: PHYSICAL HEALTH: ON AVERAGE, HOW MANY DAYS PER WEEK DO YOU ENGAGE IN MODERATE TO STRENUOUS EXERCISE (LIKE A BRISK WALK)?: 3 DAYS

## 2024-02-15 ASSESSMENT — PATIENT HEALTH QUESTIONNAIRE - PHQ9
SUM OF ALL RESPONSES TO PHQ QUESTIONS 1-9: 12
SUM OF ALL RESPONSES TO PHQ QUESTIONS 1-9: 12
10. IF YOU CHECKED OFF ANY PROBLEMS, HOW DIFFICULT HAVE THESE PROBLEMS MADE IT FOR YOU TO DO YOUR WORK, TAKE CARE OF THINGS AT HOME, OR GET ALONG WITH OTHER PEOPLE: SOMEWHAT DIFFICULT

## 2024-02-15 ASSESSMENT — SOCIAL DETERMINANTS OF HEALTH (SDOH): HOW OFTEN DO YOU GET TOGETHER WITH FRIENDS OR RELATIVES?: MORE THAN THREE TIMES A WEEK

## 2024-02-15 ASSESSMENT — PAIN SCALES - GENERAL: PAINLEVEL: NO PAIN (0)

## 2024-02-15 NOTE — PROGRESS NOTES
Preventive Care Visit  [facility]  Jose Santana PA-C, Physician Assistant  Feb 15, 2024    Assessment & Plan     (Z00.00) Routine general medical examination at a health care facility  (primary encounter diagnosis)  Comment:   Plan: Patient declined HIV screening today, given HPV #2 as well as scheduled for Pap in approximately 2 weeks and patient is menstruating    (Z12.4) Cervical cancer screening  Comment:   Plan: Defer 2 weeks    (F33.1) Moderate episode of recurrent major depressive disorder (H)  Comment:   Plan: Adult Mental Health  Referral        Based on recent increase in stressors regarding in mental health, no medication changes were made today but patient referred to psychiatry for further medication management as well as establishing with behavioral health counselor.  Patient advised to follow-up with specialty but sooner if any worsening symptoms    (J01.90) Acute sinusitis with symptoms > 10 days  Comment:   Plan: amoxicillin-clavulanate (AUGMENTIN) 875-125 MG         tablet, fluticasone (FLONASE) 50 MCG/ACT nasal         spray        Patient advised to focus on symptom management with saline nasal rinse, nasal steroid spray, decongestants if tolerated.  Based on the course of her sinus symptoms lasting greater than 10 days, antibiotic treatment is warranted, side effects of medication discussed and patient was advised to follow-up with me if not improving over the next 5 to 7 days.      (Z91.018) Nut allergy  Comment:   Plan: EPINEPHrine (ANY BX GENERIC EQUIV) 0.3 MG/0.3ML        injection 2-pack                    Depression Screening Follow Up        2/15/2024    10:58 AM   PHQ   PHQ-9 Total Score 12   Q9: Thoughts of better off dead/self-harm past 2 weeks Not at all         Follow Up Actions Taken  Crisis resource information provided in After Visit Summary  Mental Health Referral placed     Counseling  Appropriate preventive services were discussed with  this patient, including applicable screening as appropriate for fall prevention, nutrition, physical activity, Tobacco-use cessation, weight loss and cognition.  Checklist reviewing preventive services available has been given to the patient.  Reviewed patient's diet, addressing concerns and/or questions.   Trice Fletcher is at risk for lack of exercise and has been provided with information to increase physical activity for the benefit of Trice Fletcher's well-being.   The patient's PHQ-9 score is consistent with moderate depression. Trice Fletcher was provided with information regarding depression.     Patient has been advised of split billing requirements and indicates understanding: Yes        Vincent Carnes is a 23 year old, presenting for the following:  Physical (Physical )        2/15/2024    11:10 AM   Additional Questions   Roomed by Stephanie Mata        Health Care Directive  Patient does not have a Health Care Directive or Living Will: Discussed advance care planning with patient; however, patient declined at this time.    Healthy Habits:     Getting at least 3 servings of Calcium per day:  Yes    Bi-annual eye exam:  Yes    Dental care twice a year:  Yes    Sleep apnea or symptoms of sleep apnea:  None    Diet:  Regular (no restrictions)    Frequency of exercise:  2-3 days/week    Duration of exercise:  30-45 minutes    Taking medications regularly:  Yes    Barriers to taking medications:  None    Medication side effects:  Not applicable    Additional concerns today:  No      Acute Illness  Acute illness concerns: cough and congestion for 6 weeks  Onset/Duration:   Symptoms:  Fever: No  Chills/Sweats: No  Headache (location?): YES  Sinus Pressure: YES  Conjunctivitis:  No  Ear Pain: no  Rhinorrhea: YES  Congestion: YES  Sore Throat: No  Cough: YES  Wheeze: No  Decreased Appetite: No  Nausea: No  Vomiting: No  Diarrhea: No  Dysuria/Freq.: No  Dysuria or Hematuria: No  Fatigue/Achiness: No  Sick/Strep  Exposure: No  Therapies tried and outcome: None    Pt also with a hx of MDD, not currently seeing a mental health provider, taking lamictal and abilify for several years.  She notes increases stress with being out of work at this time, interviews for next week set up, only seasonal positions; they are interested in reconnecting with mental health provider        2/15/2024   General Health   How would you rate your overall physical health? (!) FAIR   Feel stress (tense, anxious, or unable to sleep) To some extent   (!) STRESS CONCERN      2/15/2024   Nutrition   Three or more servings of calcium each day? Yes   Diet: Regular (no restrictions)   How many servings of fruit and vegetables per day? (!) 2-3   How many sweetened beverages each day? 0-1         2/15/2024   Exercise   Days per week of moderate/strenous exercise 3 days         2/15/2024   Social Factors   Frequency of gathering with friends or relatives More than three times a week   Worry food won't last until get money to buy more No   Food not last or not have enough money for food? No   Do you have housing?  Yes   Are you worried about losing your housing? No   Lack of transportation? No   Unable to get utilities (heat,electricity)? No         2/15/2024   Dental   Dentist two times every year? Yes         2/15/2024   TB Screening   Were you born outside of [place]?  No       Today's PHQ-9 Score:       2/15/2024    10:58 AM   PHQ-9 SCORE   PHQ-9 Total Score MyChart 12 (Moderate depression)   PHQ-9 Total Score 12         2/15/2024   Substance Use   Alcohol more than 3/day or more than 7/wk No   Do you use any other substances recreationally? No     Social History     Tobacco Use    Smoking status: Never     Passive exposure: Never    Smokeless tobacco: Never   Vaping Use    Vaping Use: Never used   Substance Use Topics    Alcohol use: Yes     Comment: one drink per week     Drug use: No             2/15/2024   Breast Cancer Screening   Family history of breast,  "colon, or ovarian cancer? Yes         2/15/2024   LAST FHS-7 RESULTS   1st degree relative breast or ovarian cancer No   Any relative bilateral breast cancer Yes   Any male have breast cancer No   Any woman have breast and ovarian cancer No   Any woman with breast cancer before 50yrs No   2 or more relatives with breast and/or ovarian cancer No   2 or more relatives with breast and/or bowel cancer No                  2/15/2024   One time HIV Screening   Previous HIV test? I don't know         2/15/2024   STI Screening   New sexual partner(s) since last STI/HIV test? No     History of abnormal Pap smear: NO - age 21-29 PAP every 3 years recommended        12/30/2020     1:45 PM   PAP / HPV   PAP (Historical) NIL            2/15/2024   Contraception/Family Planning   Questions about contraception or family planning No        Reviewed and updated as needed this visit by Provider                    BP Readings from Last 3 Encounters:   02/15/24 98/63   12/29/23 116/72   12/24/23 102/61    Wt Readings from Last 3 Encounters:   02/15/24 66.7 kg (147 lb)   12/29/23 68.9 kg (152 lb)   12/24/23 68.9 kg (152 lb)                  Objective    Exam  LMP 02/15/2024    Estimated body mass index is 22.45 kg/m  as calculated from the following:    Height as of 5/23/23: 1.753 m (5' 9\").    Weight as of 12/29/23: 68.9 kg (152 lb).    Physical Exam  GENERAL: alert and no distress  EYES: Eyes grossly normal to inspection, PERRL and conjunctivae and sclerae normal  HENT: ear canals and TM's normal, nose congested with sinus TTP maxillary and mouth without ulcers or lesions, some PND  NECK: no adenopathy, no asymmetry, masses, or scars  RESP: lungs clear to auscultation - no rales, rhonchi or wheezes  CV: regular rate and rhythm, normal S1 S2, no S3 or S4, no murmur, click or rub, no peripheral edema  ABDOMEN: soft, nontender, no hepatosplenomegaly, no masses and bowel sounds normal  MS: no gross musculoskeletal defects noted, no " edema  SKIN: no suspicious lesions or rashes  NEURO: Normal strength and tone, mentation intact and speech normal  PSYCH: mentation appears normal, affect flat      Signed Electronically by: Jose Santana PA-C    Answers submitted by the patient for this visit:  Patient Health Questionnaire (Submitted on 2/15/2024)  If you checked off any problems, how difficult have these problems made it for you to do your work, take care of things at home, or get along with other people?: Somewhat difficult  PHQ9 TOTAL SCORE: 12

## 2024-02-15 NOTE — PATIENT INSTRUCTIONS
Preventive Care Advice   This is general advice given by our system to help you stay healthy. However, your care team may have specific advice just for you. Please talk to your care team about your preventive care needs.  Nutrition  Eat 5 or more servings of fruits and vegetables each day.  Try wheat bread, brown rice and whole grain pasta (instead of white bread, rice, and pasta).  Get enough calcium and vitamin D. Check the label on foods and aim for 100% of the RDA (recommended daily allowance).  Lifestyle  Exercise at least 150 minutes each week  (30 minutes a day, 5 days a week).  Do muscle strengthening activities 2 days a week. These help control your weight and prevent disease.  No smoking.  Wear sunscreen to prevent skin cancer.  Have a dental exam and cleaning every 6 months.  Yearly exams  See your health care team every year to talk about:  Any changes in your health.  Any medicines your care team has prescribed.  Preventive care, family planning, and ways to prevent chronic diseases.  Shots (vaccines)   HPV shots (up to age 26), if you've never had them before.  Hepatitis B shots (up to age 59), if you've never had them before.  COVID-19 shot: Get this shot when it's due.  Flu shot: Get a flu shot every year.  Tetanus shot: Get a tetanus shot every 10 years.  Pneumococcal, hepatitis A, and RSV shots: Ask your care team if you need these based on your risk.  Shingles shot (for age 50 and up)  General health tests  Diabetes screening:  Starting at age 35, Get screened for diabetes at least every 3 years.  If you are younger than age 35, ask your care team if you should be screened for diabetes.  Cholesterol test: At age 39, start having a cholesterol test every 5 years, or more often if advised.  Bone density scan (DEXA): At age 50, ask your care team if you should have this scan for osteoporosis (brittle bones).  Hepatitis C: Get tested at least once in your life.  STIs (sexually transmitted  infections)  Before age 24: Ask your care team if you should be screened for STIs.  After age 24: Get screened for STIs if you're at risk. You are at risk for STIs (including HIV) if:  You are sexually active with more than one person.  You don't use condoms every time.  You or a partner was diagnosed with a sexually transmitted infection.  If you are at risk for HIV, ask about PrEP medicine to prevent HIV.  Get tested for HIV at least once in your life, whether you are at risk for HIV or not.  Cancer screening tests  Cervical cancer screening: If you have a cervix, begin getting regular cervical cancer screening tests starting at age 21.  Breast cancer scan (mammogram): If you've ever had breasts, begin having regular mammograms starting at age 40. This is a scan to check for breast cancer.  Colon cancer screening: It is important to start screening for colon cancer at age 45.  Have a colonoscopy test every 10 years (or more often if you're at risk) Or, ask your provider about stool tests like a FIT test every year or Cologuard test every 3 years.  To learn more about your testing options, visit:   https://www.[employer]/529596.pdf.  For help making a decision, visit:   https://bit.ly/wj44275.  Prostate cancer screening test: If you have a prostate, ask your care team if a prostate cancer screening test (PSA) at age 55 is right for you.  Lung cancer screening: If you are a current or former smoker ages 50 to 80, ask your care team if ongoing lung cancer screenings are right for you.  For informational purposes only. Not to replace the advice of your health care provider. Copyright   2023 [facility]. All rights reserved. Clinically reviewed by the [facility] Transitions Program. [employer] 528981 - REV 01/24.    Learning About Stress  What is stress?     Stress is your body's response to a hard situation. Your body can have a physical, emotional, or mental response. Stress is a fact of life for  most people, and it affects everyone differently. What causes stress for you may not be stressful for someone else.  A lot of things can cause stress. You may feel stress when you go on a job interview, take a test, or run a race. This kind of short-term stress is normal and even useful. It can help you if you need to work hard or react quickly. For example, stress can help you finish an important job on time.  Long-term stress is caused by ongoing stressful situations or events. Examples of long-term stress include long-term health problems, ongoing problems at work, or conflicts in your family. Long-term stress can harm your health.  How does stress affect your health?  When you are stressed, your body responds as though you are in danger. It makes hormones that speed up your heart, make you breathe faster, and give you a burst of energy. This is called the fight-or-flight stress response. If the stress is over quickly, your body goes back to normal and no harm is done.  But if stress happens too often or lasts too long, it can have bad effects. Long-term stress can make you more likely to get sick, and it can make symptoms of some diseases worse. If you tense up when you are stressed, you may develop neck, shoulder, or low back pain. Stress is linked to high blood pressure and heart disease.  Stress also harms your emotional health. It can make you figueroa, tense, or depressed. Your relationships may suffer, and you may not do well at work or school.  What can you do to manage stress?  You can try these things to help manage stress:   Do something active. Exercise or activity can help reduce stress. Walking is a great way to get started. Even everyday activities such as housecleaning or yard work can help.  Try yoga or emily chi. These techniques combine exercise and meditation. You may need some training at first to learn them.  Do something you enjoy. For example, listen to music or go to a movie. Practice your  "hobby or do volunteer work.  Meditate. This can help you relax, because you are not worrying about what happened before or what may happen in the future.  Do guided imagery. Imagine yourself in any setting that helps you feel calm. You can use online videos, books, or a teacher to guide you.  Do breathing exercises. For example:  From a standing position, bend forward from the waist with your knees slightly bent. Let your arms dangle close to the floor.  Breathe in slowly and deeply as you return to a standing position. Roll up slowly and lift your head last.  Hold your breath for just a few seconds in the standing position.  Breathe out slowly and bend forward from the waist.  Let your feelings out. Talk, laugh, cry, and express anger when you need to. Talking with supportive friends or family, a counselor, or a yordy leader about your feelings is a healthy way to relieve stress. Avoid discussing your feelings with people who make you feel worse.  Write. It may help to write about things that are bothering you. This helps you find out how much stress you feel and what is causing it. When you know this, you can find better ways to cope.  What can you do to prevent stress?  You might try some of these things to help prevent stress:  Manage your time. This helps you find time to do the things you want and need to do.  Get enough sleep. Your body recovers from the stresses of the day while you are sleeping.  Get support. Your family, friends, and community can make a difference in how you experience stress.  Limit your news feed. Avoid or limit time on social media or news that may make you feel stressed.  Do something active. Exercise or activity can help reduce stress. Walking is a great way to get started.  Where can you learn more?  Go to https://www.healthwise.net/patiented  Enter N032 in the search box to learn more about \"Learning About Stress.\"  Current as of: February 26, 2023               Content Version: " 13.8    9637-3244 [employer].   Care instructions adapted under license by your healthcare professional. If you have questions about a medical condition or this instruction, always ask your healthcare professional. [employer] disclaims any warranty or liability for your use of this information.      Learning About Depression Screening  What is depression screening?  Depression screening is a way to see if you have depression symptoms. It may be done by a doctor or counselor. It's often part of a routine checkup. That's because your mental health is just as important as your physical health.  Depression is a mental health condition that affects how you feel, think, and act. You may:  Have less energy.  Lose interest in your daily activities.  Feel sad and grouchy for a long time.  Depression is very common. It affects people of all ages.  Many things can lead to depression. Some people become depressed after they have a stroke or find out they have a major illness like cancer or heart disease. The death of a loved one or a breakup may lead to depression. It can run in families. Most experts believe that a combination of inherited genes and stressful life events can cause it.  What happens during screening?  You may be asked to fill out a form about your depression symptoms. You and the doctor will discuss your answers. The doctor may ask you more questions to learn more about how you think, act, and feel.  What happens after screening?  If you have symptoms of depression, your doctor will talk to you about your options.  Doctors usually treat depression with medicines or counseling. Often, combining the two works best. Many people don't get help because they think that they'll get over the depression on their own. But people with depression may not get better unless they get treatment.  The cause of depression is not well understood. There may be many factors involved. But if you have  "depression, it's not your fault.  A serious symptom of depression is thinking about death or suicide. If you or someone you care about talks about this or about feeling hopeless, get help right away.  It's important to know that depression can be treated. Medicine, counseling, and self-care may help.  Where can you learn more?  Go to https://www.[employer].net/patiented  Enter T185 in the search box to learn more about \"Learning About Depression Screening.\"  Current as of: June 25, 2023               Content Version: 13.8    4233-4071 [employer].   Care instructions adapted under license by your healthcare professional. If you have questions about a medical condition or this instruction, always ask your healthcare professional. [employer] disclaims any warranty or liability for your use of this information.      "

## 2024-12-04 ENCOUNTER — TELEPHONE (OUTPATIENT)
Dept: DERMATOLOGY | Facility: CLINIC | Age: 24
End: 2024-12-04

## 2024-12-04 NOTE — TELEPHONE ENCOUNTER
12/4 Left Voicemail (1st Attempt) and Sent Mychart (1st Attempt) for the patient to call back and schedule the following:    Appointment type: New Dermatology  Provider: Ti  Return date: 12/23/2024 AT 1:45 PM (HELD)  Specialty phone number: [phone]  Additional appointment(s) needed: N/A  Additonal Notes: N/A

## 2025-01-13 NOTE — PROGRESS NOTES
[facility] D[facility]atology Note  Encounter Date: Jan 14, 2025  Office Visit     Reviewed patients past medical history and pertinent chart review prior to patients visit today.     Dermatology Problem List:  # Impacted lip piercing  - removed 1/14/2025     ____________________________________________    Assessment & Plan:     # Impacted lip piercing  - The patient expresses interest in removal of the piercing. We discussed risks including scarring and infection. I do not recommend re-piercing the site.     Procedure, Removal of foreign body of the mouth: The risks and benefits of the procedure were discussed, and the patient consented to these procedures. Using 1% lidocaine with epinephrine for local anesthesia, a 15 blade was utilized to make a linear incision in the mucosal lower lip. The back of the piercing was revealed and the entire piercing was removed. Hemostasis was achieved with electrocautery. Care instructions reviewed. The patient tolerated the procedure well and left the clinic in good condition.     Follow up as needed.     All risks, benefits and alternatives were discussed with patient.  Patient is in agreement and understands the assessment and plan.  All questions were answered.  Manuela Crawford PA-C  [facility]  _______________________________________    CC: Derm Problem (Trice is here today for a piercing removal on the lower lip )    HPI:    Trice Fletcher is a(n) 24 year old adult who presents today as a new patient for an impacted lip piercing. The patient got the piercing about a year and a half ago. The piercing became infected months ago, leading to the skin growing over the back of the piercing.  This is bothersome to the patient. No prior history of poor wound healing or impacted piercings. Patient is otherwise feeling well, without additional skin concerns.      Physical Exam:  SKIN: Focused examination of lower lip was performed.  - The midline  cutaneous lower lip demonstrates a piercing, the mucosal inner lip covers the back of the piercing.     - No other lesions of concern on areas examined.     Medications:  Current Outpatient Medications   Medication Sig Dispense Refill    EPINEPHrine (ANY BX GENERIC EQUIV) 0.3 MG/0.3ML injection 2-pack Inject 0.3 mLs (0.3 mg) into the muscle once as needed for anaphylaxis. 1 each 0    fluocinonide (LIDEX) 0.05 % external cream APPLY TWICE A DAY TO THE PSORIATIC PLAQUES      fluticasone (FLONASE) 50 MCG/ACT nasal spray Spray 1 spray into both nostrils daily 18.2 mL 1    lamoTRIgine (LAMICTAL) 200 MG tablet Take 200 mg by mouth daily      spironolactone (ALDACTONE) 100 MG tablet Take 1 tablet (100 mg) by mouth 2 times daily. 180 tablet 3    tretinoin (RETIN-A) 0.025 % external cream Apply topically at bedtime. 20 g 3    triamcinolone (KENALOG) 0.025 % cream Apply topically 2 times daily. 15 g 3    VITAMIN D, CHOLECALCIFEROL, PO Take 5,000 Units by mouth daily      ARIPiprazole (ABILIFY) 2 MG tablet Take 1 tablet by mouth daily at 2 pm (Patient not taking: Reported on 1/14/2025)       No current facility-administered medications for this visit.      Past Medical History:   Patient Active Problem List   Diagnosis    ECZEMA    Allergic rhinitis due to other allergen    Family history of thyroid disease    Vitamin D deficiency    Bilateral pulmonary contusion    Closed fracture of multiple ribs of right side    Laceration of liver    Traumatic pneumothorax     Past Medical History:   Diagnosis Date    Contact dermatitis and other eczema, due to unspecified cause        IRENE Perales PA-C  [address], [address]  [place] [address] on close of this encounter.

## 2025-01-14 ENCOUNTER — OFFICE VISIT (OUTPATIENT)
Dept: DERMATOLOGY | Facility: CLINIC | Age: 25
End: 2025-01-14
Attending: STUDENT IN AN ORGANIZED HEALTH CARE EDUCATION/TRAINING PROGRAM
Payer: MEDICAID

## 2025-01-14 DIAGNOSIS — S00.551A FOREIGN BODY IN LIP, INITIAL ENCOUNTER: ICD-10-CM

## 2025-01-14 RX ORDER — LIDOCAINE HYDROCHLORIDE AND EPINEPHRINE 10; 10 MG/ML; UG/ML
3 INJECTION, SOLUTION INFILTRATION; PERINEURAL ONCE
Status: ACTIVE | OUTPATIENT
Start: 2025-01-14

## 2025-01-14 ASSESSMENT — PAIN SCALES - GENERAL: PAINLEVEL_OUTOF10: NO PAIN (0)

## 2025-01-14 NOTE — NURSING NOTE
Dermatology Rooming Note    Trice Fletcher's goals for this visit include:   Chief Complaint   Patient presents with    Derm Problem     Trice is here today for a piercing removal on the lower lip      Robb MACHUCA CMA

## 2025-01-14 NOTE — LETTER
1/14/2025       RE: Trice Fletcher  [address]  [facility] [address]     Dear Colleague,    Thank you for referring your patient, Trice Fletcher, to the [facility] [place] at [facility]. Please see a copy of my visit note below.    [facility] Dermatology Note  Encounter Date: Jan 14, 2025  Office Visit     Reviewed patients past medical history and pertinent chart review prior to patients visit today.     Dermatology Problem List:  # Impacted lip piercing  - removed 1/14/2025     ____________________________________________    Assessment & Plan:     # Impacted lip piercing  - The patient expresses interest in removal of the piercing. We discussed risks including scarring and infection. I do not recommend re-piercing the site.     Procedure, Removal of foreign body of the mouth: The risks and benefits of the procedure were discussed, and the patient consented to these procedures. Using 1% lidocaine with epinephrine for local anesthesia, a 15 blade was utilized to make a linear incision in the mucosal lower lip. The back of the piercing was revealed and the entire piercing was removed. Hemostasis was achieved with electrocautery. Care instructions reviewed. The patient tolerated the procedure well and left the clinic in good condition.     Follow up as needed.     All risks, benefits and alternatives were discussed with patient.  Patient is in agreement and understands the assessment and plan.  All questions were answered.  Manuela Crawford PA-C  [facility] Dermatology  _______________________________________    CC: Derm Problem (Trice is here today for a piercing removal on the lower lip )    HPI:    Trice Fletcher is a(n) 24 year old adult who presents today as a new patient for an impacted lip piercing. The patient got the piercing about a year and a half ago. The piercing became infected months ago, leading to  the skin growing over the back of the piercing.  This is bothersome to the patient. No prior history of poor wound healing or impacted piercings. Patient is otherwise feeling well, without additional skin concerns.      Physical Exam:  SKIN: Focused examination of lower lip was performed.  - The midline cutaneous lower lip demonstrates a piercing, the mucosal inner lip covers the back of the piercing.     - No other lesions of concern on areas examined.     Medications:  Current Outpatient Medications   Medication Sig Dispense Refill     EPINEPHrine (ANY BX GENERIC EQUIV) 0.3 MG/0.3ML injection 2-pack Inject 0.3 mLs (0.3 mg) into the muscle once as needed for anaphylaxis. 1 each 0     fluocinonide (LIDEX) 0.05 % external cream APPLY TWICE A DAY TO THE PSORIATIC PLAQUES       fluticasone (FLONASE) 50 MCG/ACT nasal spray Spray 1 spray into both nostrils daily 18.2 mL 1     lamoTRIgine (LAMICTAL) 200 MG tablet Take 200 mg by mouth daily       spironolactone (ALDACTONE) 100 MG tablet Take 1 tablet (100 mg) by mouth 2 times daily. 180 tablet 3     tretinoin (RETIN-A) 0.025 % external cream Apply topically at bedtime. 20 g 3     triamcinolone (KENALOG) 0.025 % cream Apply topically 2 times daily. 15 g 3     VITAMIN D, CHOLECALCIFEROL, PO Take 5,000 Units by mouth daily       ARIPiprazole (ABILIFY) 2 MG tablet Take 1 tablet by mouth daily at 2 pm (Patient not taking: Reported on 1/14/2025)       No current facility-administered medications for this visit.      Past Medical History:   Patient Active Problem List   Diagnosis     ECZEMA     Allergic rhinitis due to other allergen     Family history of thyroid disease     Vitamin D deficiency     Bilateral pulmonary contusion     Closed fracture of multiple ribs of right side     Laceration of liver     Traumatic pneumothorax     Past Medical History:   Diagnosis Date     Contact dermatitis and other eczema, due to unspecified cause        IRENE Perales PA-C  [address]  [place] [address] on close of this encounter.       Again, thank you for allowing me to participate in the care of your patient.      Sincerely,    Manuela Crawford PA-C

## 2025-01-16 ENCOUNTER — PATIENT OUTREACH (OUTPATIENT)
Dept: CARE COORDINATION | Facility: CLINIC | Age: 25
End: 2025-01-16
Payer: MEDICAID

## 2025-02-19 ENCOUNTER — MYC REFILL (OUTPATIENT)
Dept: FAMILY MEDICINE | Facility: CLINIC | Age: 25
End: 2025-02-19
Payer: MEDICAID

## 2025-02-19 DIAGNOSIS — L70.0 ACNE VULGARIS: ICD-10-CM

## 2025-02-19 RX ORDER — SPIRONOLACTONE 100 MG/1
100 TABLET, FILM COATED ORAL 2 TIMES DAILY
Qty: 180 TABLET | Refills: 3 | OUTPATIENT
Start: 2025-02-19

## 2025-03-23 ENCOUNTER — HEALTH MAINTENANCE LETTER (OUTPATIENT)
Age: 25
End: 2025-03-23